# Patient Record
Sex: MALE | Race: WHITE | Employment: OTHER | URBAN - METROPOLITAN AREA
[De-identification: names, ages, dates, MRNs, and addresses within clinical notes are randomized per-mention and may not be internally consistent; named-entity substitution may affect disease eponyms.]

---

## 2019-03-26 ENCOUNTER — OFFICE VISIT (OUTPATIENT)
Dept: FAMILY MEDICINE CLINIC | Age: 44
End: 2019-03-26

## 2019-03-26 VITALS
SYSTOLIC BLOOD PRESSURE: 113 MMHG | BODY MASS INDEX: 32.97 KG/M2 | WEIGHT: 285 LBS | DIASTOLIC BLOOD PRESSURE: 78 MMHG | TEMPERATURE: 97.5 F | HEIGHT: 78 IN | OXYGEN SATURATION: 94 % | RESPIRATION RATE: 16 BRPM | HEART RATE: 86 BPM

## 2019-03-26 DIAGNOSIS — F52.9 PROBLEM WITH SEXUAL FUNCTION: ICD-10-CM

## 2019-03-26 DIAGNOSIS — R53.83 FATIGUE, UNSPECIFIED TYPE: Primary | ICD-10-CM

## 2019-03-26 RX ORDER — LORATADINE 10 MG/1
10 TABLET ORAL DAILY
Status: ON HOLD | COMMUNITY
End: 2019-05-20

## 2019-03-26 NOTE — PROGRESS NOTES
Lauren Man is a 44 y.o. male who presents today with the following: 
 
Sexual Problem The history is provided by the patient. This is a chronic problem. The problem has not changed since onset. Pertinent negatives include no chest pain and no shortness of breath. Nothing aggravates the symptoms. Nothing relieves the symptoms. He has tried nothing for the symptoms. UNABLE TO MAINTAIN ERECTION. HAS TRIED MANY OTC VITAMINS BUT DID NOT IMPROVE. DOES NOT WANT TO START VIAGRA NOW. RATHER WANTS TO EVALUATE THE CAUSE. HISTORY OF DELAYED ONSET PUBERTY. HAS SEEN DR Fauzia STOUT IN THE PAST AS A TEENAGER. RECEIVED GROWTH HORMONE INJECTIONS TO INITIATE PUBERTY. WAS NORMAL AFTER THAT UNTIL RECENTLY STARTED HAVING SYMPTOMS Bety Mckeon Chief Complaint Patient presents with [facility] Care New Patient  Fatigue  
  noticed over time a decrease in energy levels and loss of motivation - Review of Systems Constitutional: Positive for malaise/fatigue. HENT: Negative. Eyes: Negative. Respiratory: Negative. Negative for shortness of breath. Cardiovascular: Negative. Negative for chest pain. Gastrointestinal: Negative. Genitourinary: Negative. Musculoskeletal: Negative. Skin: Negative. Neurological: Negative. Endo/Heme/Allergies: Negative. Psychiatric/Behavioral: Negative. Physical Exam  
Constitutional: He is oriented to person, place, and time and well-developed, well-nourished, and in no distress. HENT:  
Head: Normocephalic and atraumatic. Right Ear: External ear normal.  
Left Ear: External ear normal.  
Nose: Nose normal.  
Mouth/Throat: No oropharyngeal exudate. Eyes: Conjunctivae are normal.  
Neck: Normal range of motion. Neck supple. No thyromegaly present. Cardiovascular: Normal rate and regular rhythm. Pulmonary/Chest: Effort normal and breath sounds normal.  
Abdominal: Soft. Bowel sounds are normal. He exhibits no distension. There is no tenderness. Musculoskeletal: Normal range of motion. He exhibits no edema. Lymphadenopathy:  
  He has no cervical adenopathy. Neurological: He is alert and oriented to person, place, and time. Skin: Skin is warm and dry. Psychiatric: Mood and affect normal.  
Nursing note and vitals reviewed. /78 (BP 1 Location: Left arm, BP Patient Position: Sitting)   Pulse 86   Temp 97.5 °F (36.4 °C) (Oral)   Resp 16   Ht 6' 7\" (2.007 m)   Wt 285 lb (129.3 kg)   SpO2 94%   BMI 32.11 kg/m² Allergies Allergen Reactions  Peanut Hives Current Outpatient Medications Medication Sig  loratadine (CLARITIN) 10 mg tablet Take 10 mg by mouth. No current facility-administered medications for this visit. Past Medical History:  
Diagnosis Date  Anxiety  Panic attacks No past surgical history on file. No results found for this visit on 03/26/19. 
 
 
1. Fatigue, unspecified type EVALUATE WITH LAB TESTS. 
 
- TSH 3RD GENERATION 
- URINALYSIS W/ RFLX MICROSCOPIC 
- VITAMIN D, 25 HYDROXY 
- VITAMIN E34 
- METABOLIC PANEL, COMPREHENSIVE 
- LIPID PANEL 
- IRON PROFILE 
- CBC WITH AUTOMATED DIFF 
- PSA, DIAGNOSTIC (PROSTATE SPECIFIC AG) - TESTOSTERONE, TOTAL, ADULT MALE 2. Problem with sexual function - PSA, DIAGNOSTIC (PROSTATE SPECIFIC AG) - TESTOSTERONE, TOTAL, ADULT MALE Follow-up and Dispositions · Return in about 1 week (around 4/2/2019) for follow up. I have discussed the diagnosis with the patient and the intended plan as seen in the above orders. The patient has received an after-visit summary and questions were answered concerning future plans. I have discussed medication side effects and warnings with the patient as well. The patient agrees and understands above plan.   
 
 
 
 
Fermín Agosto MD

## 2019-03-26 NOTE — PROGRESS NOTES
Identified pt with two pt identifiers(name and ). Chief Complaint Patient presents with [address] [place] [address] New Patient  Fatigue  
  noticed over time a decrease in energy levels Health Maintenance Due Topic  Pneumococcal 0-64 years (1 of 1 - PPSV23)  DTaP/Tdap/Td series (1 - Tdap)  Influenza Age 5 to Adult Wt Readings from Last 3 Encounters:  
19 285 lb (129.3 kg) 16 260 lb (117.9 kg) 16 260 lb (117.9 kg) Temp Readings from Last 3 Encounters:  
16 97.3 °F (36.3 °C)  
16 97.6 °F (36.4 °C) BP Readings from Last 3 Encounters:  
16 149/86  
16 151/85 Pulse Readings from Last 3 Encounters:  
16 80  
16 74 Learning Assessment: 
:  
 
No flowsheet data found. Depression Screening: 
:  
 
3 most recent PHQ Screens 3/26/2019 Little interest or pleasure in doing things Several days Feeling down, depressed, irritable, or hopeless Several days Total Score PHQ 2 2 Fall Risk Assessment: 
:  
 
No flowsheet data found. Abuse Screening: 
:  
 
No flowsheet data found. Coordination of Care Questionnaire: 
:  
 
1) Have you been to an emergency room, urgent care clinic since your last visit? no  
Hospitalized since your last visit? no          
 
2) Have you seen or consulted any other health care providers outside of [address] [place] [address] since your last visit? no  (Include any pap smears or colon screenings in this section.) 3) Do you have an Advance Directive on file? no 
Are you interested in receiving information about Advance Directives? no 
 
Patient is accompanied by self I have received verbal consent from Eusebio Hand to discuss any/all medical information while they are present in the room. Reviewed record in preparation for visit and have obtained necessary documentation. Medication reconciliation up to date and corrected with patient at this time.

## 2019-03-27 LAB
25(OH)D3+25(OH)D2 SERPL-MCNC: 32.8 NG/ML (ref 30–100)
ALBUMIN SERPL-MCNC: 4.5 G/DL (ref 3.5–5.5)
ALBUMIN/GLOB SERPL: 1.6 {RATIO} (ref 1.2–2.2)
ALP SERPL-CCNC: 66 IU/L (ref 39–117)
ALT SERPL-CCNC: 43 IU/L (ref 0–44)
APPEARANCE UR: CLEAR
AST SERPL-CCNC: 26 IU/L (ref 0–40)
BASOPHILS # BLD AUTO: 0 X10E3/UL (ref 0–0.2)
BASOPHILS NFR BLD AUTO: 1 %
BILIRUB SERPL-MCNC: 0.3 MG/DL (ref 0–1.2)
BILIRUB UR QL STRIP: NEGATIVE
BUN SERPL-MCNC: 15 MG/DL (ref 6–24)
BUN/CREAT SERPL: 14 (ref 9–20)
CALCIUM SERPL-MCNC: 9.5 MG/DL (ref 8.7–10.2)
CHLORIDE SERPL-SCNC: 104 MMOL/L (ref 96–106)
CHOLEST SERPL-MCNC: 178 MG/DL (ref 100–199)
CO2 SERPL-SCNC: 24 MMOL/L (ref 20–29)
COLOR UR: YELLOW
CREAT SERPL-MCNC: 1.09 MG/DL (ref 0.76–1.27)
EOSINOPHIL # BLD AUTO: 0.4 X10E3/UL (ref 0–0.4)
EOSINOPHIL NFR BLD AUTO: 5 %
ERYTHROCYTE [DISTWIDTH] IN BLOOD BY AUTOMATED COUNT: 13.7 % (ref 12.3–15.4)
GLOBULIN SER CALC-MCNC: 2.8 G/DL (ref 1.5–4.5)
GLUCOSE SERPL-MCNC: 111 MG/DL (ref 65–99)
GLUCOSE UR QL: NEGATIVE
HCT VFR BLD AUTO: 45.4 % (ref 37.5–51)
HDLC SERPL-MCNC: 49 MG/DL
HGB BLD-MCNC: 16 G/DL (ref 13–17.7)
HGB UR QL STRIP: NEGATIVE
IMM GRANULOCYTES # BLD AUTO: 0 X10E3/UL (ref 0–0.1)
IMM GRANULOCYTES NFR BLD AUTO: 0 %
IRON SATN MFR SERPL: 22 % (ref 15–55)
IRON SERPL-MCNC: 81 UG/DL (ref 38–169)
KETONES UR QL STRIP: NEGATIVE
LDLC SERPL CALC-MCNC: 107 MG/DL (ref 0–99)
LEUKOCYTE ESTERASE UR QL STRIP: NEGATIVE
LYMPHOCYTES # BLD AUTO: 2.7 X10E3/UL (ref 0.7–3.1)
LYMPHOCYTES NFR BLD AUTO: 39 %
MCH RBC QN AUTO: 32.3 PG (ref 26.6–33)
MCHC RBC AUTO-ENTMCNC: 35.2 G/DL (ref 31.5–35.7)
MCV RBC AUTO: 92 FL (ref 79–97)
MICRO URNS: NORMAL
MONOCYTES # BLD AUTO: 0.5 X10E3/UL (ref 0.1–0.9)
MONOCYTES NFR BLD AUTO: 7 %
NEUTROPHILS # BLD AUTO: 3.4 X10E3/UL (ref 1.4–7)
NEUTROPHILS NFR BLD AUTO: 48 %
NITRITE UR QL STRIP: NEGATIVE
PH UR STRIP: 5 [PH] (ref 5–7.5)
PLATELET # BLD AUTO: 215 X10E3/UL (ref 150–379)
POTASSIUM SERPL-SCNC: 4.1 MMOL/L (ref 3.5–5.2)
PROT SERPL-MCNC: 7.3 G/DL (ref 6–8.5)
PROT UR QL STRIP: NEGATIVE
PSA SERPL-MCNC: 0.7 NG/ML (ref 0–4)
RBC # BLD AUTO: 4.96 X10E6/UL (ref 4.14–5.8)
SODIUM SERPL-SCNC: 142 MMOL/L (ref 134–144)
SP GR UR: 1.03 (ref 1–1.03)
TESTOST SERPL-MCNC: 340 NG/DL (ref 264–916)
TIBC SERPL-MCNC: 371 UG/DL (ref 250–450)
TRIGL SERPL-MCNC: 110 MG/DL (ref 0–149)
TSH SERPL DL<=0.005 MIU/L-ACNC: 2.26 UIU/ML (ref 0.45–4.5)
UIBC SERPL-MCNC: 290 UG/DL (ref 111–343)
UROBILINOGEN UR STRIP-MCNC: 0.2 MG/DL (ref 0.2–1)
VIT B12 SERPL-MCNC: 912 PG/ML (ref 232–1245)
VLDLC SERPL CALC-MCNC: 22 MG/DL (ref 5–40)
WBC # BLD AUTO: 7.1 X10E3/UL (ref 3.4–10.8)

## 2019-03-28 ENCOUNTER — TELEPHONE (OUTPATIENT)
Dept: FAMILY MEDICINE CLINIC | Age: 44
End: 2019-03-28

## 2019-03-28 DIAGNOSIS — R79.89 LOW TESTOSTERONE IN MALE: ICD-10-CM

## 2019-03-28 DIAGNOSIS — F52.9 PROBLEM WITH SEXUAL FUNCTION: Primary | ICD-10-CM

## 2019-03-28 NOTE — TELEPHONE ENCOUNTER
CALLED PATIENT. DISCUSSED ABNORMAL LAB RESULTS IN DETAIL. ORDERED FURTHER LAB TESTS. PATIENT TO GET LABS DONE PRIOR TO NEXT VISIT.

## 2019-04-03 ENCOUNTER — OFFICE VISIT (OUTPATIENT)
Dept: FAMILY MEDICINE CLINIC | Age: 44
End: 2019-04-03

## 2019-04-03 VITALS
HEIGHT: 78 IN | WEIGHT: 284 LBS | RESPIRATION RATE: 20 BRPM | BODY MASS INDEX: 32.86 KG/M2 | TEMPERATURE: 98.4 F | OXYGEN SATURATION: 98 % | DIASTOLIC BLOOD PRESSURE: 80 MMHG | SYSTOLIC BLOOD PRESSURE: 121 MMHG | HEART RATE: 86 BPM

## 2019-04-03 DIAGNOSIS — R53.83 FATIGUE, UNSPECIFIED TYPE: ICD-10-CM

## 2019-04-03 DIAGNOSIS — F52.9 PROBLEM WITH SEXUAL FUNCTION: Primary | ICD-10-CM

## 2019-04-03 LAB
CORTIS AM PEAK SERPL-MCNC: 18.6 UG/DL (ref 6.2–19.4)
FSH SERPL-ACNC: 3.1 MIU/ML (ref 1.5–12.4)
LH SERPL-ACNC: 4.6 MIU/ML (ref 1.7–8.6)
PROLACTIN SERPL-MCNC: 14.8 NG/ML (ref 4–15.2)
T4 SERPL-MCNC: 6.5 UG/DL (ref 4.5–12)
TESTOST FREE SERPL-MCNC: 10.8 PG/ML (ref 6.8–21.5)
TESTOST SERPL-MCNC: 436 NG/DL (ref 264–916)
TRANSFERRIN SERPL-MCNC: 259 MG/DL (ref 200–370)

## 2019-04-03 NOTE — LETTER
4/3/2019 11:21 AM 
 
Mr. Prince Lazaro [address] [id number] Dear Prince Lazaro: 
 
Please find your most recent results below. Resulted Orders PROLACTIN (Collected: 4/1/2019  8:19 AM) Result Value Ref Range Prolactin 14.8 4.0 - 15.2 ng/mL Narrative Performed at:  [address]  [id number] : Jc Burks MD, Phone:  [phone] [address] AND LH (Collected: 4/1/2019  8:19 AM) Result Value Ref Range Luteinizing hormone 4.6 1.7 - 8.6 mIU/mL FSH 3.1 1.5 - 12.4 mIU/mL Narrative Performed at:  [address]  [id number] : Jc Burks MD, Phone:  [phone] CORTISOL, AM (Collected: 4/1/2019  8:19 AM) Result Value Ref Range Cortisol, a.m. 18.6 6.2 - 19.4 ug/dL Narrative Performed at:  [address]  [id number] : Jc Burks MD, Phone:  [phone] TESTOSTERONE, FREE & TOTAL (Collected: 4/1/2019  8:19 AM) Result Value Ref Range Testosterone 436 264 - 916 ng/dL Comment:  
   Adult male reference interval is based on a population of 
healthy nonobese males (BMI <30) between 23and 44years old. [address] [place] [address], [address] [place] [address] [id number]. PMID: 91879791. Free testosterone (Direct) 10.8 6.8 - 21.5 pg/mL Narrative Performed at:  [address]  [id number] : Jc Burks MD, Phone:  [phone] TRANSFERRIN (Collected: 4/1/2019  8:19 AM) Result Value Ref Range Transferrin 259 200 - 370 mg/dL Narrative Performed at:  [address]  [id number] : Jc Burks MD, Phone:  [phone] T4 (THYROXINE) (Collected: 4/1/2019  8:19 AM) Result Value Ref Range T4, Total 6.5 4.5 - 12.0 ug/dL Narrative Performed at:  [place] [address] [address]  [id number] : Luz Castellon MD, Phone:  [phone] RECOMMENDATIONS: 
Given in office Please call me if you have any questions: [phone] Sincerely, Dora Londono MD

## 2019-04-03 NOTE — PROGRESS NOTES
1. Have you been to the ER, urgent care clinic since your last visit? Hospitalized since your last visit? No    2. Have you seen or consulted any other health care providers outside of the [address] [place] [address] since your last visit? Include any pap smears or colon screening.  No

## 2019-04-03 NOTE — PROGRESS NOTES
Juan Alberto Samuel is a 38 y.o. male who presents today with the following:    HPI  Chief Complaint   Patient presents with    Labs     follow up       Review of Systems   Constitutional: Negative. HENT: Negative. Eyes: Negative. Respiratory: Negative. Cardiovascular: Negative. Gastrointestinal: Negative. Genitourinary: Negative. Musculoskeletal: Negative. Skin: Negative. Neurological: Negative. Endo/Heme/Allergies: Negative. Psychiatric/Behavioral: Negative. Physical Exam   Constitutional: He is oriented to person, place, and time and well-developed, well-nourished, and in no distress. HENT:   Head: Normocephalic and atraumatic. Right Ear: External ear normal.   Left Ear: External ear normal.   Nose: Nose normal.   Mouth/Throat: No oropharyngeal exudate. Eyes: Conjunctivae are normal.   Neck: Normal range of motion. Neck supple. No thyromegaly present. Cardiovascular: Normal rate and regular rhythm. Pulmonary/Chest: Effort normal and breath sounds normal.   Abdominal: Soft. Bowel sounds are normal. He exhibits no distension. There is no tenderness. Musculoskeletal: Normal range of motion. He exhibits no edema. Lymphadenopathy:     He has no cervical adenopathy. Neurological: He is alert and oriented to person, place, and time. Skin: Skin is warm and dry. Psychiatric: Mood and affect normal.   Nursing note and vitals reviewed. /80 (BP 1 Location: Left arm, BP Patient Position: Sitting)   Pulse 86   Temp 98.4 °F (36.9 °C) (Oral)   Resp 20   Ht 6' 7\" (2.007 m)   Wt 284 lb (128.8 kg)   SpO2 98%   BMI 31.99 kg/m²     Allergies   Allergen Reactions    Peanut Hives       Current Outpatient Medications   Medication Sig    loratadine (CLARITIN) 10 mg tablet Take 10 mg by mouth. No current facility-administered medications for this visit. Past Medical History:   Diagnosis Date    Anxiety     Panic attacks        History reviewed.  No pertinent surgical history. No results found for this visit on 04/03/19. 1. Problem with sexual function  TESTOSTERONE LEVELS IN NORMAL RANGE    - REFERRAL TO ENDOCRINOLOGY    2. Fatigue, unspecified type  ALL LABS NORMAL  - REFERRAL TO ENDOCRINOLOGY        Follow-up and Dispositions    · Return if symptoms worsen or fail to improve. I have discussed the diagnosis with the patient and the intended plan as seen in the above orders. The patient has received an after-visit summary and questions were answered concerning future plans. I have discussed medication side effects and warnings with the patient as well. The patient agrees and understands above plan.            Harsha Moore MD

## 2019-04-10 ENCOUNTER — OFFICE VISIT (OUTPATIENT)
Dept: FAMILY MEDICINE CLINIC | Age: 44
End: 2019-04-10

## 2019-04-10 VITALS
HEIGHT: 78 IN | RESPIRATION RATE: 16 BRPM | WEIGHT: 287 LBS | HEART RATE: 70 BPM | BODY MASS INDEX: 33.21 KG/M2 | TEMPERATURE: 98.6 F | DIASTOLIC BLOOD PRESSURE: 75 MMHG | SYSTOLIC BLOOD PRESSURE: 115 MMHG | OXYGEN SATURATION: 96 %

## 2019-04-10 DIAGNOSIS — R30.0 BURNING WITH URINATION: Primary | ICD-10-CM

## 2019-04-10 LAB
BILIRUB UR QL STRIP: NEGATIVE
GLUCOSE UR-MCNC: NEGATIVE MG/DL
KETONES P FAST UR STRIP-MCNC: NEGATIVE MG/DL
PH UR STRIP: 5.5 [PH] (ref 4.6–8)
PROT UR QL STRIP: NORMAL
SP GR UR STRIP: 1.03 (ref 1–1.03)
UA UROBILINOGEN AMB POC: NORMAL (ref 0.2–1)
URINALYSIS CLARITY POC: CLEAR
URINALYSIS COLOR POC: YELLOW
URINE BLOOD POC: NEGATIVE
URINE LEUKOCYTES POC: NEGATIVE
URINE NITRITES POC: NEGATIVE

## 2019-04-10 RX ORDER — NITROFURANTOIN 25; 75 MG/1; MG/1
100 CAPSULE ORAL 2 TIMES DAILY
Qty: 14 CAP | Refills: 0 | Status: SHIPPED | OUTPATIENT
Start: 2019-04-10 | End: 2019-04-17

## 2019-04-10 NOTE — PROGRESS NOTES
Shola Cano is a 38 y.o. male      Chief Complaint   Patient presents with    Bladder Infection     x 1 day          1. Have you been to the ER, urgent care clinic since your last visit? Hospitalized since your last visit? no      2. Have you seen or consulted any other health care providers outside of the [address] [place] [address] since your last visit? Include any pap smears or colon screening.   no

## 2019-04-10 NOTE — PROGRESS NOTES
Assessment/Plan:     Diagnoses and all orders for this visit:    1. Burning with urination  -     AMB POC URINE DIP STICK MANUAL W/O MICRO CHEMSTRIP-10  -     CULTURE, URINE  -     nitrofurantoin, macrocrystal-monohydrate, (MACROBID) 100 mg capsule; Take 1 Cap by mouth two (2) times a day for 7 days. - Worsening, start [address] today, will culture urine and treat accordingly. RTC if symptoms do not resolve. Drink more water, limit soda and tea. Follow-up and Dispositions    · Return for PRN. Discussed expected course/resolution/complications of diagnosis in detail with patient.    Medication risks/benefits/costs/interactions/alternatives discussed with patient.    Pt was given after visit summary which includes diagnoses, current medications & vitals. Pt expressed understanding with the diagnosis and plan        Subjective:      Rowe Mohs is a 38 y.o. male who presents for had concerns including Bladder Infection (x 1 day ). Here today for burning when urinating for a couple of days. Denies fevers, hematuria. Had a UTI in 2017 in the summer. States since he had a kidney stone in 2016 that was 2nd and 3rd and passed them naturally. Was given flomax. States he drinks a lot of sodas every day and stopped them about a week ago. Denies exposure to STD. Quit smoking 10 years ago. Vapes on occasion. Current Outpatient Medications   Medication Sig Dispense Refill    nitrofurantoin, macrocrystal-monohydrate, (MACROBID) 100 mg capsule Take 1 Cap by mouth two (2) times a day for 7 days. 14 Cap 0    loratadine (CLARITIN) 10 mg tablet Take 10 mg by mouth. Allergies   Allergen Reactions    Peanut Hives       ROS:   Review of Systems   Respiratory: Negative for shortness of breath. Cardiovascular: Negative for chest pain and palpitations. Gastrointestinal: Negative for abdominal pain. Genitourinary: Positive for dysuria and urgency.  Negative for flank pain, frequency and hematuria. Neurological: Negative for dizziness and headaches. Objective:     Visit Vitals  /75 (BP 1 Location: Right arm, BP Patient Position: Sitting)   Pulse 70   Temp 98.6 °F (37 °C) (Oral)   Resp 16   Ht 6' 7\" (2.007 m)   Wt 287 lb (130.2 kg)   SpO2 96%   BMI 32.33 kg/m²       Vitals and Nurse Documentation reviewed. Physical Exam   Constitutional: He is well-developed, well-nourished, and in no distress. No distress. HENT:   Head: Normocephalic and atraumatic. Cardiovascular: Normal rate, regular rhythm and normal heart sounds. Exam reveals no gallop and no friction rub. No murmur heard. Pulmonary/Chest: Effort normal and breath sounds normal. No respiratory distress. He has no wheezes. He has no rales. Abdominal: Normal appearance and bowel sounds are normal. There is no hepatosplenomegaly. There is no tenderness. There is no rebound and no CVA tenderness. Genitourinary:   Genitourinary Comments: Refused by patient   Neurological: He is alert. Skin: He is not diaphoretic.    Psychiatric: Affect normal.       Results for orders placed or performed in visit on 04/10/19   AMB POC URINE DIP STICK MANUAL W/O MICRO CHEMSTRIP-10   Result Value Ref Range    Color (UA POC) Yellow     Clarity (UA POC) Clear     Specific gravity (UA POC) 1.030 1.001 - 1.035    pH (UA POC) 5.5 4.6 - 8.0    Leukocyte esterase (UA POC) Negative Negative    Nitrites (UA POC) Negative Negative    Protein (UA POC) Trace Negative    Glucose (UA POC) Negative Negative mg/dL    Ketones (UA POC) Negative Negative    Urobilinogen (UA POC) 0.2 mg/dL 0.2 - 1    Bilirubin (UA POC) Negative Negative    Blood (UA POC) Negative Negative

## 2019-04-10 NOTE — PATIENT INSTRUCTIONS
Painful Urination (Dysuria): Care Instructions  Your Care Instructions  Burning pain with urination (dysuria) is a common symptom of a urinary tract infection or other urinary problems. The bladder may become inflamed. This can cause pain when the bladder fills and empties. You may also feel pain if the tube that carries urine from the bladder to the outside of the body (urethra) gets irritated or infected. Sexually transmitted infections (STIs) also may cause pain when you urinate. Sometimes the pain can be caused by things other than an infection. The urethra can be irritated by soaps, perfumes, or foreign objects in the urethra. Kidney stones can cause pain when they pass through the urethra. The cause may be hard to find. You may need tests. Treatment for painful urination depends on the cause. Follow-up care is a key part of your treatment and safety. Be sure to make and go to all appointments, and call your doctor if you are having problems. It's also a good idea to know your test results and keep a list of the medicines you take. How can you care for yourself at home? · Drink extra water for the next day or two. This will help make the urine less concentrated. (If you have kidney, heart, or liver disease and have to limit fluids, talk with your doctor before you increase the amount of fluids you drink.)  · Avoid drinks that are carbonated or have caffeine. They can irritate the bladder. · Urinate often. Try to empty your bladder each time. For women:  · Urinate right after you have sex. · After going to the bathroom, wipe from front to back. · Avoid douches, bubble baths, and feminine hygiene sprays. And avoid other feminine hygiene products that have deodorants. When should you call for help? Call your doctor now or seek immediate medical care if:    · You have new symptoms, such as fever, nausea, or vomiting.     · You have new or worse symptoms of a urinary problem. For example:  ?  You have blood or pus in your urine. ? You have chills or body aches. ? It hurts worse to urinate. ? You have groin or belly pain. ? You have pain in your back just below your rib cage (the flank area).    Watch closely for changes in your health, and be sure to contact your doctor if you have any problems. Where can you learn more? Go to http://ladan-david.info/. Enter Y035 in the search box to learn more about \"Painful Urination (Dysuria): Care Instructions. \"  Current as of: March 20, 2018  Content Version: 11.9  © 3133-9901 [employer]. Care instructions adapted under license by [employer] (which disclaims liability or warranty for this information). If you have questions about a medical condition or this instruction, always ask your healthcare professional. [address] any warranty or liability for your use of this information.

## 2019-04-12 LAB — BACTERIA UR CULT: NO GROWTH

## 2019-04-15 NOTE — PROGRESS NOTES
TC - to Mr. Cash Vargas, he is not having any more burning  at this time, he was told to f/u as needed- MJ

## 2019-05-08 ENCOUNTER — OFFICE VISIT (OUTPATIENT)
Dept: FAMILY MEDICINE CLINIC | Age: 44
End: 2019-05-08

## 2019-05-08 VITALS
DIASTOLIC BLOOD PRESSURE: 86 MMHG | TEMPERATURE: 98.1 F | BODY MASS INDEX: 31.93 KG/M2 | HEIGHT: 78 IN | OXYGEN SATURATION: 98 % | SYSTOLIC BLOOD PRESSURE: 126 MMHG | RESPIRATION RATE: 18 BRPM | HEART RATE: 87 BPM | WEIGHT: 276 LBS

## 2019-05-08 DIAGNOSIS — F41.0 ANXIETY ATTACK: Primary | ICD-10-CM

## 2019-05-08 RX ORDER — ALPRAZOLAM 0.25 MG/1
0.25 TABLET ORAL
Qty: 30 TAB | Refills: 0 | Status: SHIPPED | OUTPATIENT
Start: 2019-05-08 | End: 2019-05-22

## 2019-05-08 RX ORDER — BUSPIRONE HYDROCHLORIDE 7.5 MG/1
7.5 TABLET ORAL 3 TIMES DAILY
Qty: 90 TAB | Refills: 3 | Status: SHIPPED | OUTPATIENT
Start: 2019-05-08 | End: 2019-05-16 | Stop reason: SINTOL

## 2019-05-08 RX ORDER — BUSPIRONE HYDROCHLORIDE 7.5 MG/1
7.5 TABLET ORAL 3 TIMES DAILY
Qty: 90 TAB | Refills: 3 | Status: SHIPPED | OUTPATIENT
Start: 2019-05-08 | End: 2019-05-08 | Stop reason: SDUPTHER

## 2019-05-08 NOTE — PROGRESS NOTES
Lisandro Angela is a 38 y.o. male who presents today with the following: HPI Chief Complaint Patient presents with  
[facility] Follow Up  
  panic attack 05/07/19 - [facility] [place]  Cyst  
  on right foot x2-3 weeks and swelling  Medication Evaluation  
  pt wanting to start new med, but does not want SSRI due to side effects  Anxiety Patient had an ultrasound done at [facility], ER, which showed epididymal cyst.  He was advised to follow-up with [facility] in 2 days. Patient will follow-up with them. Patient has already seen a urologist.  His next scheduled appointment with [facility] is in October but he will see them sooner for epididymal cyst. 
 
He says he had a side effect from the NSAIDs that were prescribed by an urgent care. Patient was having flank pain and was prescribed Naprosyn. He had bilateral leg swelling up to his knees with that. He stopped taking that medication and his swelling has improved. But he noticed that he has developed a cyst on the right foot dorsal surface. The cyst is nontender, noninfectious. Patient says that he had a panic attack when he felt a tenderness in his scrotum. He went to the ER his heart rate was 160 at that time. EKG was normal but tachycardic. Ultrasound of the scrotum showed epididymal cyst 3 cm in size. Patient states that he is becoming a hypochondriac. He is very anxious about his health. He is started taking healthy diet and drinking water. Review of Systems Constitutional: Negative. HENT: Negative. Eyes: Negative. Respiratory: Negative. Cardiovascular: Negative. Gastrointestinal: Negative. Genitourinary: Negative. Lower abdominal pain going down in the inguinal area. Musculoskeletal: Negative. Cyst on right foot dorsal surface. Skin: Negative. Neurological: Negative. Endo/Heme/Allergies: Negative. Psychiatric/Behavioral: Positive for depression. Negative for suicidal ideas. The patient is nervous/anxious. Physical Exam  
Constitutional: He is oriented to person, place, and time and well-developed, well-nourished, and in no distress. HENT:  
Head: Normocephalic and atraumatic. Right Ear: External ear normal.  
Left Ear: External ear normal.  
Nose: Nose normal.  
Mouth/Throat: No oropharyngeal exudate. Eyes: Conjunctivae are normal.  
Neck: Normal range of motion. Neck supple. No thyromegaly present. Cardiovascular: Normal rate and regular rhythm. Pulmonary/Chest: Effort normal and breath sounds normal.  
Abdominal: Soft. Bowel sounds are normal. He exhibits no distension. There is no tenderness. Musculoskeletal: Normal range of motion. He exhibits no edema. Feet: 
 
Lymphadenopathy:  
  He has no cervical adenopathy. Neurological: He is alert and oriented to person, place, and time. Skin: Skin is warm and dry. Psychiatric: Affect normal. His mood appears anxious. He expresses impulsivity. He exhibits a depressed mood. He expresses no homicidal and no suicidal ideation. He expresses no suicidal plans and no homicidal plans. Nursing note and vitals reviewed. /86   Pulse 87   Temp 98.1 °F (36.7 °C) (Oral)   Resp 18   Ht 6' 7\" (2.007 m)   Wt 276 lb (125.2 kg)   SpO2 98%   BMI 31.09 kg/m² Allergies Allergen Reactions  Peanut Hives Current Outpatient Medications Medication Sig  ALPRAZolam (XANAX) 0.25 mg tablet Take 1 Tab by mouth nightly as needed for Anxiety. Max Daily Amount: 0.25 mg.  
 busPIRone (BUSPAR) 7.5 mg tablet Take 1 Tab by mouth three (3) times daily.  loratadine (CLARITIN) 10 mg tablet Take 10 mg by mouth. No current facility-administered medications for this visit. Past Medical History:  
Diagnosis Date  Anxiety  Panic attacks History reviewed. No pertinent surgical history. No results found for this visit on 05/08/19. 
 
 
1. Anxiety attack Patient is tearful in the office. He does not want to try SSRIs at this time. He does not want to try psychotherapy or counseling at this time. He is very anxious about his health. He is requesting to try BuSpar. He is also requesting to get Xanax. I advised patient that I would only give Xanax as needed 30 tablets once. Patient to start on BuSpar and we can go up on that dose. If he needs more Xanax I will refer him to psychiatrist. 
 
- ALPRAZolam Estil Massa) 0.25 mg tablet; Take 1 Tab by mouth nightly as needed for Anxiety. Max Daily Amount: 0.25 mg. Dispense: 30 Tab; Refill: 0 
- busPIRone (BUSPAR) 7.5 mg tablet; Take 1 Tab by mouth three (3) times daily. Dispense: 90 Tab; Refill: 3 Foot cyst: I advised patient to monitor the cyst for 10 days. If it does not resolve on its own I will order x-rays. Follow-up and Dispositions · Return in about 1 month (around 6/8/2019) for follow up, med check. I have discussed the diagnosis with the patient and the intended plan as seen in the above orders. The patient has received an after-visit summary and questions were answered concerning future plans. I have discussed medication side effects and warnings with the patient as well. The patient agrees and understands above plan.   
 
 
 
 
Dora Londono MD

## 2019-05-08 NOTE — PROGRESS NOTES
Chief Complaint Patient presents with  
[facility] Follow Up  
  panic attack 05/07/19 - [facility] [place]  Cyst  
  on right foot x2-3 weeks and swelling  Medication Evaluation  
  pt wanting to start new med, but does not want SSRI due to side effects  Anxiety Pt went to ER yesterday, because he was having pain in his groin that initiated a panic attack. US negative, EKG negative, and Urine + labs negative per patient. 3 most recent PHQ Screens 4/10/2019 Little interest or pleasure in doing things Not at all Feeling down, depressed, irritable, or hopeless Not at all Total Score PHQ 2 0

## 2019-05-14 ENCOUNTER — TELEPHONE (OUTPATIENT)
Dept: FAMILY MEDICINE CLINIC | Age: 44
End: 2019-05-14

## 2019-05-14 NOTE — TELEPHONE ENCOUNTER
Pt called stating the Buspar side effects are making him zombie-like. He said he doesn't have anxiety but he doesn't feel much of anything. Says morning pill makes him drowsy and dizzy and he is slow to react. Pt wants to know if he should keep taking medication.

## 2019-05-16 ENCOUNTER — OFFICE VISIT (OUTPATIENT)
Dept: FAMILY MEDICINE CLINIC | Age: 44
End: 2019-05-16

## 2019-05-16 VITALS
TEMPERATURE: 97.8 F | SYSTOLIC BLOOD PRESSURE: 111 MMHG | BODY MASS INDEX: 31.82 KG/M2 | RESPIRATION RATE: 16 BRPM | WEIGHT: 275 LBS | HEART RATE: 84 BPM | HEIGHT: 78 IN | OXYGEN SATURATION: 97 % | DIASTOLIC BLOOD PRESSURE: 75 MMHG

## 2019-05-16 DIAGNOSIS — R73.01 IMPAIRED FASTING BLOOD SUGAR: ICD-10-CM

## 2019-05-16 DIAGNOSIS — R23.2 ABNORMAL FLUSHING AND SWEATING: ICD-10-CM

## 2019-05-16 DIAGNOSIS — R00.0 TACHYCARDIA: Primary | ICD-10-CM

## 2019-05-16 DIAGNOSIS — F41.0 ANXIETY ATTACK: ICD-10-CM

## 2019-05-16 DIAGNOSIS — R61 ABNORMAL FLUSHING AND SWEATING: ICD-10-CM

## 2019-05-16 RX ORDER — FLUOXETINE HYDROCHLORIDE 20 MG/1
20 CAPSULE ORAL DAILY
Qty: 30 CAP | Refills: 3 | Status: SHIPPED | OUTPATIENT
Start: 2019-05-16 | End: 2019-05-22

## 2019-05-16 RX ORDER — LEVOFLOXACIN 500 MG/1
TABLET, FILM COATED ORAL
Refills: 0 | Status: ON HOLD | COMMUNITY
Start: 2019-05-11 | End: 2019-05-20

## 2019-05-16 NOTE — PROGRESS NOTES
Identified pt with two pt identifiers(name and ). Reviewed record in preparation for visit and have obtained necessary documentation. Chief Complaint   Patient presents with    Leg Pain     Left leg from knee to groin, states, \"sometimes I feel like theres a little bubbly feeling behind left knee. \" [facility]- scrotal US done cyst found, Dx'd with epididymitis and given levafloxacin, which is causing dizzines; pt requesting second opinion    Tingling     of hands    Medication Evaluation     [facility]    Labs     requesting bloodwork, he states \"I just feel like something is wrong. I shouldnt be having pain like this\"        Health Maintenance Due   Topic    DTaP/Tdap/Td series (1 - Tdap)       Coordination of Care Questionnaire:  :   1) Have you been to an emergency room, urgent care, or hospitalized since your last visit? If yes, where when, and reason for visit? yes   [facility] for today's complaint    2. Have seen or consulted any other health care provider since your last visit? If yes, where when, and reason for visit? NO    Patient is accompanied by self I have received verbal consent from Radha Austin to discuss any/all medical information while they are present in the room.

## 2019-05-16 NOTE — PROGRESS NOTES
Russ Joseph is a 38 y.o. male who presents today with the following:    HPI  Chief Complaint   Patient presents with    Leg Pain     Left leg from knee to groin, states, \"sometimes I feel like theres a little bubbly feeling behind left knee. \" [facility]- scrotal US done cyst found, Dx'd with epididymitis and given levafloxacin, which is causing dizzines; pt requesting second opinion    Tingling     of hands    Medication Evaluation     [facility]    Labs     requesting bloodwork, he states \"I just feel like something is wrong. I shouldnt be having pain like this\"     Patient presents with multiple symptoms, his groin ache started a few weeks ago and he was started on antibiotic related to urinary tract infection but it did not improve his pain. He was referred to [facility] which did a CT scan which was normal.  Then patient started having symptoms of testicular pain and swelling of the scrotum and redness of the scrotum, but he went to urgent Dr. Kandy Harrington ER where his blood pressure was elevated and heart rate was high. An ultrasound of the scrotum was done in the ER which was normal except for a small epididymal cyst.  He was referred to urology again. Over the weekend he went to an urgent care because of burning sensation in the scrotum area and was seen by Dr. Leonardo Hodges. He was diagnosed with epididymitis and was started on Levaquin. Patient is saying that the Levaquin is making him more dizzy although his dizziness has been around for couple of months. He has also called his urologist and he said that epididymal cyst can cause epididymitis. He was offered an appointment with the urology PA but he refused at this time. Regarding his dizziness it has been going on since January. Patient states that he is trying to lose weight and not eating much but drinking lots of water. Other symptoms include sweating tingling lightheadedness.   His fingers and hands started getting tingling up to his elbows. He was taking multivitamins and now he has stopped taking all but one. Some nurse has told him that he should get work-up to rule out Cushing syndrome because his face is round. He wants to get lab work done today. He has symptoms of esophageal reflux and abdominal bloating. He also feels hot flashes. Previous lab work was normal and I reviewed it even today with him. He says that he has taken Xanax which helped his symptoms so he is attributing some of his symptoms to his anxiety. BuSpar was helping him in regards to making him not anxious but he stopped taking it because he felt lack of emotion and made him drowsy. In the past he has tried Wellbutrin which made him jittery, Celexa which caused sexual side effects, Lexapro also caused sexual side effects he has tried counseling in the past but did not like it and does not want to go back to psychiatrist.  He is open to try another SSRI at this time. Review of Systems   Constitutional: Positive for malaise/fatigue. HENT: Negative. Eyes: Negative. Respiratory: Negative. Cardiovascular: Negative. Gastrointestinal: Negative. Genitourinary: Negative. Groin and inguinal area pain   Musculoskeletal: Negative. Skin: Negative. Neurological: Positive for tingling. Endo/Heme/Allergies: Negative. Psychiatric/Behavioral: Positive for depression. The patient is nervous/anxious. Physical Exam   Constitutional: He is oriented to person, place, and time and well-developed, well-nourished, and in no distress. HENT:   Head: Normocephalic and atraumatic. Right Ear: External ear normal.   Left Ear: External ear normal.   Nose: Nose normal.   Mouth/Throat: No oropharyngeal exudate. Eyes: Conjunctivae are normal.   Neck: Normal range of motion. Neck supple. No thyromegaly present. Cardiovascular: Normal rate and regular rhythm.    Pulmonary/Chest: Effort normal and breath sounds normal.   Abdominal: Soft. Bowel sounds are normal. He exhibits no distension. There is no tenderness. Musculoskeletal: Normal range of motion. He exhibits no edema. Lymphadenopathy:     He has no cervical adenopathy. Neurological: He is alert and oriented to person, place, and time. Skin: Skin is warm and dry. Psychiatric: Mood and affect normal.   Nursing note and vitals reviewed. /75   Pulse 84   Temp 97.8 °F (36.6 °C) (Oral)   Resp 16   Ht 6' 7\" (2.007 m)   Wt 275 lb (124.7 kg)   SpO2 97%   BMI 30.98 kg/m²     Allergies   Allergen Reactions    Diclofenac Swelling     Leg swelling    Peanut Hives       Current Outpatient Medications   Medication Sig    FLUoxetine (PROZAC) 20 mg capsule Take 1 Cap by mouth daily.  ALPRAZolam (XANAX) 0.25 mg tablet Take 1 Tab by mouth nightly as needed for Anxiety. Max Daily Amount: 0.25 mg.    levoFLOXacin (LEVAQUIN) 500 mg tablet take 1 tablet by mouth once daily for 10 days    loratadine (CLARITIN) 10 mg tablet Take 10 mg by mouth daily. No current facility-administered medications for this visit. Past Medical History:   Diagnosis Date    Anxiety     Panic attacks        No past surgical history on file. No results found for this visit on 05/16/19.      1. Tachycardia  EKG was normal.  Patient feels anxious. He attributes his high heart rate to his anxiety  - AMB POC EKG ROUTINE W/ 12 LEADS, INTER & REP  - METABOLIC PANEL, COMPREHENSIVE    2. Anxiety attack  Patient has been having recent anxiety attacks more frequently. He says that Xanax helps with some of his symptoms. He stopped taking BuSpar due to side effects. He wants to start Prozac. I counseled him that he needs to see a psychiatrist for his management of anxiety attacks and panic attacks.  - FLUoxetine (PROZAC) 20 mg capsule; Take 1 Cap by mouth daily. Dispense: 30 Cap; Refill: 3  - REFERRAL TO PSYCHIATRY    3.  Abnormal flushing and sweating  Patient wants to check for Cushing's syndrome. I will order some lab work. He denies any use of prednisone or any type of steroids in the past but recently  - HEMOGLOBIN A1C WITH EAG  - CBC WITH AUTOMATED DIFF  - SALIVARY CORTISOL X2, TIMED  - CORTISOL, URINE FREE 24 HR    4. Impaired fasting blood sugar  Has had abnormal fasting blood sugar wants to rule out diabetes. - HEMOGLOBIN A1C WITH EAG  - METABOLIC PANEL, COMPREHENSIVE        Follow-up and Dispositions    · Return in about 2 weeks (around 5/30/2019) for follow up. I have discussed the diagnosis with the patient and the intended plan as seen in the above orders. The patient has received an after-visit summary and questions were answered concerning future plans. I have discussed medication side effects and warnings with the patient as well. The patient agrees and understands above plan.            Jazzmine Montoya MD

## 2019-05-17 LAB
ALBUMIN SERPL-MCNC: 4.8 G/DL (ref 3.5–5.5)
ALBUMIN/GLOB SERPL: 2.1 {RATIO} (ref 1.2–2.2)
ALP SERPL-CCNC: 66 IU/L (ref 39–117)
ALT SERPL-CCNC: 36 IU/L (ref 0–44)
AST SERPL-CCNC: 26 IU/L (ref 0–40)
BASOPHILS # BLD AUTO: 0 X10E3/UL (ref 0–0.2)
BASOPHILS NFR BLD AUTO: 0 %
BILIRUB SERPL-MCNC: 0.5 MG/DL (ref 0–1.2)
BUN SERPL-MCNC: 15 MG/DL (ref 6–24)
BUN/CREAT SERPL: 13 (ref 9–20)
CALCIUM SERPL-MCNC: 9.8 MG/DL (ref 8.7–10.2)
CHLORIDE SERPL-SCNC: 100 MMOL/L (ref 96–106)
CO2 SERPL-SCNC: 26 MMOL/L (ref 20–29)
CREAT SERPL-MCNC: 1.12 MG/DL (ref 0.76–1.27)
EOSINOPHIL # BLD AUTO: 0.1 X10E3/UL (ref 0–0.4)
EOSINOPHIL NFR BLD AUTO: 2 %
ERYTHROCYTE [DISTWIDTH] IN BLOOD BY AUTOMATED COUNT: 13.8 % (ref 12.3–15.4)
EST. AVERAGE GLUCOSE BLD GHB EST-MCNC: 114 MG/DL
GLOBULIN SER CALC-MCNC: 2.3 G/DL (ref 1.5–4.5)
GLUCOSE SERPL-MCNC: 121 MG/DL (ref 65–99)
HBA1C MFR BLD: 5.6 % (ref 4.8–5.6)
HCT VFR BLD AUTO: 46.4 % (ref 37.5–51)
HGB BLD-MCNC: 16.4 G/DL (ref 13–17.7)
IMM GRANULOCYTES # BLD AUTO: 0 X10E3/UL (ref 0–0.1)
IMM GRANULOCYTES NFR BLD AUTO: 0 %
LYMPHOCYTES # BLD AUTO: 1.4 X10E3/UL (ref 0.7–3.1)
LYMPHOCYTES NFR BLD AUTO: 27 %
MCH RBC QN AUTO: 32.3 PG (ref 26.6–33)
MCHC RBC AUTO-ENTMCNC: 35.3 G/DL (ref 31.5–35.7)
MCV RBC AUTO: 92 FL (ref 79–97)
MONOCYTES # BLD AUTO: 0.3 X10E3/UL (ref 0.1–0.9)
MONOCYTES NFR BLD AUTO: 5 %
NEUTROPHILS # BLD AUTO: 3.4 X10E3/UL (ref 1.4–7)
NEUTROPHILS NFR BLD AUTO: 66 %
PLATELET # BLD AUTO: 205 X10E3/UL (ref 150–379)
POTASSIUM SERPL-SCNC: 4.5 MMOL/L (ref 3.5–5.2)
PROT SERPL-MCNC: 7.1 G/DL (ref 6–8.5)
RBC # BLD AUTO: 5.07 X10E6/UL (ref 4.14–5.8)
SODIUM SERPL-SCNC: 140 MMOL/L (ref 134–144)
WBC # BLD AUTO: 5.2 X10E3/UL (ref 3.4–10.8)

## 2019-05-20 ENCOUNTER — HOSPITAL ENCOUNTER (EMERGENCY)
Age: 44
Discharge: PSYCHIATRIC OR SUBSTANCE USE FACILITY | End: 2019-05-20
Attending: EMERGENCY MEDICINE
Payer: COMMERCIAL

## 2019-05-20 ENCOUNTER — HOSPITAL ENCOUNTER (INPATIENT)
Age: 44
LOS: 2 days | Discharge: HOME | DRG: 756 | End: 2019-05-22
Attending: PSYCHIATRY & NEUROLOGY | Admitting: PSYCHIATRY & NEUROLOGY
Payer: COMMERCIAL

## 2019-05-20 VITALS
TEMPERATURE: 97.8 F | HEART RATE: 92 BPM | RESPIRATION RATE: 18 BRPM | HEIGHT: 78 IN | SYSTOLIC BLOOD PRESSURE: 166 MMHG | BODY MASS INDEX: 31.82 KG/M2 | OXYGEN SATURATION: 97 % | DIASTOLIC BLOOD PRESSURE: 106 MMHG | WEIGHT: 275 LBS

## 2019-05-20 DIAGNOSIS — R45.851 SUICIDAL IDEATION: ICD-10-CM

## 2019-05-20 DIAGNOSIS — F41.1 ANXIETY STATE: Primary | ICD-10-CM

## 2019-05-20 PROBLEM — F32.A DEPRESSION: Status: ACTIVE | Noted: 2019-05-20

## 2019-05-20 LAB
ALBUMIN SERPL-MCNC: 4.2 G/DL (ref 3.5–5)
ALBUMIN/GLOB SERPL: 1.3 {RATIO} (ref 1.1–2.2)
ALP SERPL-CCNC: 68 U/L (ref 45–117)
ALT SERPL-CCNC: 47 U/L (ref 12–78)
AMPHET UR QL SCN: NEGATIVE
ANION GAP SERPL CALC-SCNC: 3 MMOL/L (ref 5–15)
APAP SERPL-MCNC: <2 UG/ML (ref 10–30)
APPEARANCE UR: CLEAR
AST SERPL-CCNC: 25 U/L (ref 15–37)
ATRIAL RATE: 84 BPM
BACTERIA URNS QL MICRO: NEGATIVE /HPF
BARBITURATES UR QL SCN: NEGATIVE
BASOPHILS # BLD: 0 K/UL (ref 0–0.1)
BASOPHILS NFR BLD: 1 % (ref 0–1)
BENZODIAZ UR QL: NEGATIVE
BILIRUB SERPL-MCNC: 0.7 MG/DL (ref 0.2–1)
BILIRUB UR QL: NEGATIVE
BUN SERPL-MCNC: 13 MG/DL (ref 6–20)
BUN/CREAT SERPL: 13 (ref 12–20)
CALCIUM SERPL-MCNC: 9 MG/DL (ref 8.5–10.1)
CALCULATED P AXIS, ECG09: 19 DEGREES
CALCULATED R AXIS, ECG10: -9 DEGREES
CALCULATED T AXIS, ECG11: 13 DEGREES
CANNABINOIDS UR QL SCN: NEGATIVE
CHLORIDE SERPL-SCNC: 106 MMOL/L (ref 97–108)
CK SERPL-CCNC: 135 U/L (ref 39–308)
CO2 SERPL-SCNC: 28 MMOL/L (ref 21–32)
COCAINE UR QL SCN: NEGATIVE
COLOR UR: ABNORMAL
CREAT SERPL-MCNC: 1.03 MG/DL (ref 0.7–1.3)
DIAGNOSIS, 93000: NORMAL
DIFFERENTIAL METHOD BLD: NORMAL
DRUG SCRN COMMENT,DRGCM: NORMAL
EOSINOPHIL # BLD: 0.1 K/UL (ref 0–0.4)
EOSINOPHIL NFR BLD: 2 % (ref 0–7)
EPITH CASTS URNS QL MICRO: ABNORMAL /LPF
ERYTHROCYTE [DISTWIDTH] IN BLOOD BY AUTOMATED COUNT: 12.1 % (ref 11.5–14.5)
ETHANOL SERPL-MCNC: <10 MG/DL
GLOBULIN SER CALC-MCNC: 3.3 G/DL (ref 2–4)
GLUCOSE SERPL-MCNC: 110 MG/DL (ref 65–100)
GLUCOSE UR STRIP.AUTO-MCNC: NEGATIVE MG/DL
HCT VFR BLD AUTO: 46 % (ref 36.6–50.3)
HGB BLD-MCNC: 16.1 G/DL (ref 12.1–17)
HGB UR QL STRIP: NEGATIVE
HYALINE CASTS URNS QL MICRO: ABNORMAL /LPF (ref 0–5)
IMM GRANULOCYTES # BLD AUTO: 0 K/UL (ref 0–0.04)
IMM GRANULOCYTES NFR BLD AUTO: 0 % (ref 0–0.5)
KETONES UR QL STRIP.AUTO: 15 MG/DL
LEUKOCYTE ESTERASE UR QL STRIP.AUTO: NEGATIVE
LIPASE SERPL-CCNC: 206 U/L (ref 73–393)
LYMPHOCYTES # BLD: 2.1 K/UL (ref 0.8–3.5)
LYMPHOCYTES NFR BLD: 33 % (ref 12–49)
MCH RBC QN AUTO: 32.7 PG (ref 26–34)
MCHC RBC AUTO-ENTMCNC: 35 G/DL (ref 30–36.5)
MCV RBC AUTO: 93.5 FL (ref 80–99)
METHADONE UR QL: NEGATIVE
MONOCYTES # BLD: 0.5 K/UL (ref 0–1)
MONOCYTES NFR BLD: 7 % (ref 5–13)
NEUTS SEG # BLD: 3.6 K/UL (ref 1.8–8)
NEUTS SEG NFR BLD: 57 % (ref 32–75)
NITRITE UR QL STRIP.AUTO: NEGATIVE
NRBC # BLD: 0 K/UL (ref 0–0.01)
NRBC BLD-RTO: 0 PER 100 WBC
OPIATES UR QL: NEGATIVE
P-R INTERVAL, ECG05: 154 MS
PCP UR QL: NEGATIVE
PH UR STRIP: 5.5 [PH] (ref 5–8)
PLATELET # BLD AUTO: 177 K/UL (ref 150–400)
PMV BLD AUTO: 10.2 FL (ref 8.9–12.9)
POTASSIUM SERPL-SCNC: 4 MMOL/L (ref 3.5–5.1)
PROT SERPL-MCNC: 7.5 G/DL (ref 6.4–8.2)
PROT UR STRIP-MCNC: NEGATIVE MG/DL
Q-T INTERVAL, ECG07: 360 MS
QRS DURATION, ECG06: 92 MS
QTC CALCULATION (BEZET), ECG08: 425 MS
RBC # BLD AUTO: 4.92 M/UL (ref 4.1–5.7)
RBC #/AREA URNS HPF: ABNORMAL /HPF (ref 0–5)
SALICYLATES SERPL-MCNC: <1.7 MG/DL (ref 2.8–20)
SODIUM SERPL-SCNC: 137 MMOL/L (ref 136–145)
SP GR UR REFRACTOMETRY: 1.01 (ref 1–1.03)
UR CULT HOLD, URHOLD: NORMAL
UROBILINOGEN UR QL STRIP.AUTO: 0.2 EU/DL (ref 0.2–1)
VENTRICULAR RATE, ECG03: 84 BPM
WBC # BLD AUTO: 6.3 K/UL (ref 4.1–11.1)
WBC URNS QL MICRO: ABNORMAL /HPF (ref 0–4)

## 2019-05-20 PROCEDURE — 90791 PSYCH DIAGNOSTIC EVALUATION: CPT

## 2019-05-20 PROCEDURE — 81001 URINALYSIS AUTO W/SCOPE: CPT

## 2019-05-20 PROCEDURE — 65220000003 HC RM SEMIPRIVATE PSYCH

## 2019-05-20 PROCEDURE — 93005 ELECTROCARDIOGRAM TRACING: CPT

## 2019-05-20 PROCEDURE — 74011250636 HC RX REV CODE- 250/636: Performed by: EMERGENCY MEDICINE

## 2019-05-20 PROCEDURE — 99285 EMERGENCY DEPT VISIT HI MDM: CPT

## 2019-05-20 PROCEDURE — 96361 HYDRATE IV INFUSION ADD-ON: CPT

## 2019-05-20 PROCEDURE — 83690 ASSAY OF LIPASE: CPT

## 2019-05-20 PROCEDURE — 85025 COMPLETE CBC W/AUTO DIFF WBC: CPT

## 2019-05-20 PROCEDURE — 80307 DRUG TEST PRSMV CHEM ANLYZR: CPT

## 2019-05-20 PROCEDURE — 96360 HYDRATION IV INFUSION INIT: CPT

## 2019-05-20 PROCEDURE — 82550 ASSAY OF CK (CPK): CPT

## 2019-05-20 PROCEDURE — 36415 COLL VENOUS BLD VENIPUNCTURE: CPT

## 2019-05-20 PROCEDURE — 80053 COMPREHEN METABOLIC PANEL: CPT

## 2019-05-20 RX ORDER — BENZTROPINE MESYLATE 1 MG/ML
2 INJECTION INTRAMUSCULAR; INTRAVENOUS
Status: DISCONTINUED | OUTPATIENT
Start: 2019-05-20 | End: 2019-05-22 | Stop reason: HOSPADM

## 2019-05-20 RX ORDER — ADHESIVE BANDAGE
30 BANDAGE TOPICAL DAILY PRN
Status: DISCONTINUED | OUTPATIENT
Start: 2019-05-20 | End: 2019-05-22 | Stop reason: HOSPADM

## 2019-05-20 RX ORDER — BENZTROPINE MESYLATE 2 MG/1
2 TABLET ORAL
Status: DISCONTINUED | OUTPATIENT
Start: 2019-05-20 | End: 2019-05-22 | Stop reason: HOSPADM

## 2019-05-20 RX ORDER — OLANZAPINE 5 MG/1
5 TABLET ORAL
Status: DISCONTINUED | OUTPATIENT
Start: 2019-05-20 | End: 2019-05-22 | Stop reason: HOSPADM

## 2019-05-20 RX ORDER — IBUPROFEN 200 MG
1 TABLET ORAL
Status: DISCONTINUED | OUTPATIENT
Start: 2019-05-20 | End: 2019-05-22 | Stop reason: HOSPADM

## 2019-05-20 RX ORDER — TRAZODONE HYDROCHLORIDE 50 MG/1
50 TABLET ORAL
Status: DISCONTINUED | OUTPATIENT
Start: 2019-05-20 | End: 2019-05-21

## 2019-05-20 RX ORDER — ACETAMINOPHEN 325 MG/1
650 TABLET ORAL
Status: DISCONTINUED | OUTPATIENT
Start: 2019-05-20 | End: 2019-05-22 | Stop reason: HOSPADM

## 2019-05-20 RX ADMIN — SODIUM CHLORIDE 1000 ML: 900 INJECTION, SOLUTION INTRAVENOUS at 07:00

## 2019-05-20 NOTE — PROGRESS NOTES
Problem: Anxiety Goal: *Alleviation of anxiety Note:  
Patient denies SI. Med and meal compliant. Anxious. Somatic. Cooperative.

## 2019-05-20 NOTE — H&P
Hospitalist Admission NoteNAME: Asia Amanda :  1975 MRN:  [id number] Date/Time:  2019 5:27 PM 
 
Patient PCP: Tamea Shone, MD 
________________________________________________________________________ My assessment of this patient's clinical condition and my plan of care is as follows. Assessment / Plan: 
 
1) manic  Episode: Patient at his normal state os mine at the time of this encounter. Psych to eval and manage 2) Anxiety: Psych to eval and manage 3) Hx PTSD Patient medically stable for psych treatment Code Status: full Surrogate Decision Maker: DVT Prophylaxis:  
GI Prophylaxis: not indicated Baseline: lives at home Subjective: CHIEF COMPLAINT: anxiety, raising thoughts HISTORY OF PRESENT ILLNESS:    
Leopoldo Fine is a 38 y.o. Male PMH anxiety,PTSD coming to ED [facility] because raising thoughts and anxiety. He mentioned that he was placed on Prozac and xanax recently an ever since he has not been feeling well. Last week he was feeling \"tingling in his hands and feet\". Recently he was placed on levaquin for apparently epididymitis. He came to the ED because around 3 am he was pacing in his back yard and could not control his thoughts. HE called the help line and a nurse suggested for him to go to an ed for further eval. 
 
We were asked to admit for work up and evaluation of the above problems. Past Medical History:  
Diagnosis Date  Anxiety  Nicotine vapor product user  Panic attacks History reviewed. No pertinent surgical history. Social History Tobacco Use  Smoking status: Never Smoker  Smokeless tobacco: Current User  Tobacco comment: Vaping Substance Use Topics  Alcohol use: Yes Comment: patient reports 1-2 drinks a year Family History Problem Relation Age of Onset  No Known Problems Mother  No Known Problems Father  Suicide Maternal Aunt Allergies Allergen Reactions  Diclofenac Swelling Leg swelling  Levaquin [Levofloxacin] Other (comments) Pins and needles sensation to arms and legs  Peanut Hives Prior to Admission medications Medication Sig Start Date End Date Taking? Authorizing Provider FLUoxetine (PROZAC) 20 mg capsule Take 1 Cap by mouth daily. 5/16/19  Yes Armando Turner MD  
ALPRAZolam (XANAX) 0.25 mg tablet Take 1 Tab by mouth nightly as needed for Anxiety. Max Daily Amount: 0.25 mg. 5/8/19  Yes Armando Turner MD  
 
 
REVIEW OF SYSTEMS:    
I am not able to complete the review of systems because: The patient is intubated and sedated The patient has altered mental status due to his acute medical problems The patient has baseline aphasia from prior stroke(s) The patient has baseline dementia and is not reliable historian The patient is in acute medical distress and unable to provide information Total of 12 systems reviewed as follows:   
   POSITIVE= underlined text  Negative = text not underlined General:  fever, chills, sweats, generalized weakness, weight loss/gain,  
   loss of appetite Eyes:    blurred vision, eye pain, loss of vision, double vision ENT:    rhinorrhea, pharyngitis Respiratory:   cough, sputum production, SOB, CANNON, wheezing, pleuritic pain  
Cardiology:   chest pain, palpitations, orthopnea, PND, edema, syncope Gastrointestinal:  abdominal pain , N/V, diarrhea, dysphagia, constipation, bleeding Genitourinary:  frequency, urgency, dysuria, hematuria, incontinence Muskuloskeletal :  arthralgia, myalgia, back pain Hematology:  easy bruising, nose or gum bleeding, lymphadenopathy Dermatological: rash, ulceration, pruritis, color change / jaundice Endocrine:   hot flashes or polydipsia Neurological:  headache, dizziness, confusion, focal weakness, paresthesia, Speech difficulties, memory loss, gait difficulty Psychological: Feelings of anxiety, depression, agitation Objective: VITALS:   
Visit Vitals /88 Pulse 85 Temp 97.6 °F (36.4 °C) Resp 18 Ht 6' 5\" (1.956 m) Wt 124.7 kg (275 lb) BMI 32.61 kg/m² PHYSICAL EXAM: 
 
 
_______________________________________________________________________ Care Plan discussed with: 
  Comments Patient x Family RN Care Manager Consultant:     
_______________________________________________________________________ Expected  Disposition:  
Home with Family x HH/PT/OT/RN   
SNF/LTC   
[facility]   
________________________________________________________________________ TOTAL TIME:  35  Minutes Critical Care Provided     Minutes non procedure based Comments Reviewed previous records  
>50% of visit spent in counseling and coordination of care  Discussion with patient and/or family and questions answered 
  
 
________________________________________________________________________ Signed: Pamela Chi MD 
 
 Procedures: see electronic medical records for all procedures/Xrays and details which were not copied into this note but were reviewed prior to creation of Plan. LAB DATA REVIEWED:   
Recent Results (from the past 24 hour(s)) EKG, 12 LEAD, INITIAL Collection Time: 05/20/19  6:58 AM  
Result Value Ref Range Ventricular Rate 84 BPM  
 Atrial Rate 84 BPM  
 P-R Interval 154 ms QRS Duration 92 ms Q-T Interval 360 ms QTC Calculation (Bezet) 425 ms Calculated P Axis 19 degrees Calculated R Axis -9 degrees Calculated T Axis 13 degrees Diagnosis Sinus rhythm with sinus arrhythmia Minimal voltage criteria for LVH, may be normal variant Borderline ECG Confirmed by Cecilia Villar MD. ([id number]) on 5/20/2019 8:43:17 AM 
  
CBC WITH AUTOMATED DIFF Collection Time: 05/20/19  6:59 AM  
Result Value Ref Range WBC 6.3 4.1 - 11.1 K/uL  
 RBC 4.92 4.10 - 5.70 M/uL  
 HGB 16.1 12.1 - 17.0 g/dL HCT 46.0 36.6 - 50.3 % MCV 93.5 80.0 - 99.0 FL  
 MCH 32.7 26.0 - 34.0 PG  
 MCHC 35.0 30.0 - 36.5 g/dL  
 RDW 12.1 11.5 - 14.5 % PLATELET 831 560 - 093 K/uL MPV 10.2 8.9 - 12.9 FL  
 NRBC 0.0 0  WBC ABSOLUTE NRBC 0.00 0.00 - 0.01 K/uL NEUTROPHILS 57 32 - 75 % LYMPHOCYTES 33 12 - 49 % MONOCYTES 7 5 - 13 % EOSINOPHILS 2 0 - 7 % BASOPHILS 1 0 - 1 % IMMATURE GRANULOCYTES 0 0.0 - 0.5 % ABS. NEUTROPHILS 3.6 1.8 - 8.0 K/UL  
 ABS. LYMPHOCYTES 2.1 0.8 - 3.5 K/UL  
 ABS. MONOCYTES 0.5 0.0 - 1.0 K/UL  
 ABS. EOSINOPHILS 0.1 0.0 - 0.4 K/UL  
 ABS. BASOPHILS 0.0 0.0 - 0.1 K/UL  
 ABS. IMM. GRANS. 0.0 0.00 - 0.04 K/UL  
 DF AUTOMATED METABOLIC PANEL, COMPREHENSIVE Collection Time: 05/20/19  6:59 AM  
Result Value Ref Range Sodium 137 136 - 145 mmol/L Potassium 4.0 3.5 - 5.1 mmol/L Chloride 106 97 - 108 mmol/L  
 CO2 28 21 - 32 mmol/L Anion gap 3 (L) 5 - 15 mmol/L Glucose 110 (H) 65 - 100 mg/dL  BUN 13 6 - 20 MG/DL  
 Creatinine 1.03 0.70 - 1.30 MG/DL  
 BUN/Creatinine ratio 13 12 - 20 GFR est AA >60 >60 ml/min/1.73m2 GFR est non-AA >60 >60 ml/min/1.73m2 Calcium 9.0 8.5 - 10.1 MG/DL Bilirubin, total 0.7 0.2 - 1.0 MG/DL  
 ALT (SGPT) 47 12 - 78 U/L  
 AST (SGOT) 25 15 - 37 U/L Alk. phosphatase 68 45 - 117 U/L Protein, total 7.5 6.4 - 8.2 g/dL Albumin 4.2 3.5 - 5.0 g/dL Globulin 3.3 2.0 - 4.0 g/dL A-G Ratio 1.3 1.1 - 2.2 LIPASE Collection Time: 05/20/19  6:59 AM  
Result Value Ref Range Lipase 206 73 - 393 U/L  
ETHYL ALCOHOL Collection Time: 05/20/19  6:59 AM  
Result Value Ref Range ALCOHOL(ETHYL),SERUM <06 <90 MG/DL  
SALICYLATE Collection Time: 05/20/19  6:59 AM  
Result Value Ref Range Salicylate level <6.1 (L) 2.8 - 20.0 MG/DL  
ACETAMINOPHEN Collection Time: 05/20/19  6:59 AM  
Result Value Ref Range Acetaminophen level <2 (L) 10 - 30 ug/mL CK W/ REFLX CKMB Collection Time: 05/20/19  6:59 AM  
Result Value Ref Range  39 - 308 U/L  
URINALYSIS W/MICROSCOPIC Collection Time: 05/20/19  8:42 AM  
Result Value Ref Range Color YELLOW/STRAW Appearance CLEAR CLEAR Specific gravity 1.008 1.003 - 1.030    
 pH (UA) 5.5 5.0 - 8.0 Protein NEGATIVE  NEG mg/dL Glucose NEGATIVE  NEG mg/dL Ketone 15 (A) NEG mg/dL Bilirubin NEGATIVE  NEG Blood NEGATIVE  NEG Urobilinogen 0.2 0.2 - 1.0 EU/dL Nitrites NEGATIVE  NEG Leukocyte Esterase NEGATIVE  NEG    
 WBC 0-4 0 - 4 /hpf  
 RBC 0-5 0 - 5 /hpf Epithelial cells FEW FEW /lpf Bacteria NEGATIVE  NEG /hpf Hyaline cast 0-2 0 - 5 /lpf URINE CULTURE HOLD SAMPLE Collection Time: 05/20/19  8:42 AM  
Result Value Ref Range Urine culture hold URINE ON HOLD IN MICROBIOLOGY DEPT FOR 3 DAYS. IF UNPRESERVED URINE IS SUBMITTED, IT CANNOT BE USED FOR ADDITIONAL TESTING AFTER 24 HRS, RECOLLECTION WILL BE REQUIRED.   
DRUG SCREEN, URINE  
 Collection Time: 05/20/19  8:42 AM  
Result Value Ref Range AMPHETAMINES NEGATIVE  NEG    
 BARBITURATES NEGATIVE  NEG BENZODIAZEPINES NEGATIVE  NEG    
 COCAINE NEGATIVE  NEG METHADONE NEGATIVE  NEG    
 OPIATES NEGATIVE  NEG    
 PCP(PHENCYCLIDINE) NEGATIVE  NEG    
 THC (TH-CANNABINOL) NEGATIVE  NEG Drug screen comment (NOTE)

## 2019-05-20 NOTE — ROUTINE PROCESS
TRANSFER - OUT REPORT: 
 
Verbal report given to Rena Ambrose RN(name) on Go Dunbar  being transferred to [facility] [place] [facility](unit) for routine progression of care Report consisted of patients Situation, Background, Assessment and  
Recommendations(SBAR). Information from the following report(s) SBAR,  Vitals, all test results, suicide precautions was reviewed with the receiving nurse. Lines:    
 
Opportunity for questions and clarification was provided. [facility] Reassessment at this time is unchanged pt is stable for transport.

## 2019-05-20 NOTE — ED NOTES
[facility]. Pt verbalized good understanding and agreement with plan.

## 2019-05-20 NOTE — ED PROVIDER NOTES
This is a 28-year-old gentleman who comes emergency room with chief complaint of anxiety. Patient states that he was recent started on Prozac last Thursday. Patient states that he stopped that yesterday. Patient also states that he takes Xanax from time to time. Patient states that he was alone when a couple weeks ago for epididymitis. Patient they had to stop this because of tingling in his extremities. He states that this morning he woke up with a pain which was sharp in his left upper quadrant. Patient states that he had some racing thoughts in which she was going to hurt himself. Patient denies any plan to hurt himself. Patient states that he became diaphoretic and more nervous. Patient states he has history of anxiety and PTSD. Patient states that he was on Celexa however this was discontinued last summer. Patient denies any psychiatric admissions or attempts to harm himself in the past. 
 
The history is provided by the patient. No  was used. Anxiety This is a new problem. The current episode started 3 to 5 hours ago. The problem has not changed since onset. The problem occurs constantly. The pain is associated with stress. The pain is at a severity of 5/10. The pain is moderate. Associated symptoms include abdominal pain and diaphoresis. Pertinent negatives include no back pain, no cough, no dizziness, no fever, no numbness, no palpitations, no shortness of breath, no vomiting and no weakness. His past medical history does not include DM or HTN. Past Medical History:  
Diagnosis Date  Anxiety  Panic attacks No past surgical history on file. Family History:  
Problem Relation Age of Onset  No Known Problems Mother  No Known Problems Father Social History Socioeconomic History  Marital status: SINGLE Spouse name: Not on file  Number of children: Not on file  Years of education: Not on file  Highest education level: Not on file Occupational History  Not on file Social Needs  Financial resource strain: Not on file  Food insecurity:  
  Worry: Not on file Inability: Not on file  Transportation needs:  
  Medical: Not on file Non-medical: Not on file Tobacco Use  Smoking status: Never Smoker  Smokeless tobacco: Current User  Tobacco comment: Vaping Substance and Sexual Activity  Alcohol use: Yes Comment: rarely  Drug use: Yes Types: Marijuana Comment: rarely  Sexual activity: Not Currently Lifestyle  Physical activity:  
  Days per week: Not on file Minutes per session: Not on file  Stress: Not on file Relationships  Social connections:  
  Talks on phone: Not on file Gets together: Not on file Attends Christian service: Not on file Active member of club or organization: Not on file Attends meetings of clubs or organizations: Not on file Relationship status: Not on file  Intimate partner violence:  
  Fear of current or ex partner: Not on file Emotionally abused: Not on file Physically abused: Not on file Forced sexual activity: Not on file Other Topics Concern  Not on file Social History Narrative  Not on file ALLERGIES: Diclofenac; Levaquin [levofloxacin]; and Peanut Review of Systems Constitutional: Positive for diaphoresis. Negative for appetite change, chills, fever and unexpected weight change. HENT: Negative for ear pain, hearing loss, rhinorrhea and trouble swallowing. Eyes: Negative for pain and visual disturbance. Respiratory: Negative for cough, chest tightness and shortness of breath. Cardiovascular: Negative for chest pain and palpitations. Gastrointestinal: Positive for abdominal pain. Negative for abdominal distention, blood in stool and vomiting. Genitourinary: Negative for dysuria, hematuria and urgency. Musculoskeletal: Negative for back pain and myalgias. Skin: Negative for rash. Neurological: Positive for light-headedness. Negative for dizziness, syncope, weakness and numbness. Psychiatric/Behavioral: Negative for confusion and suicidal ideas. The patient is nervous/anxious. All other systems reviewed and are negative. Vitals:  
 05/20/19 6336 BP: (!) 188/117 Pulse: (!) 101 Resp: 22 Temp: 97.9 °F (36.6 °C) Weight: 124.7 kg (275 lb) Height: 6' 6\" (1.981 m) Physical Exam  
Constitutional: He is oriented to person, place, and time. He appears well-developed and well-nourished. No distress. HENT:  
Head: Normocephalic and atraumatic. Right Ear: External ear normal.  
Left Ear: External ear normal.  
Nose: Nose normal.  
Mouth/Throat: Oropharynx is clear and moist. No oropharyngeal exudate. Eyes: Pupils are equal, round, and reactive to light. Conjunctivae and EOM are normal. Right eye exhibits no discharge. Left eye exhibits no discharge. No scleral icterus. Neck: Normal range of motion. Neck supple. No JVD present. No tracheal deviation present. Cardiovascular: Normal rate, regular rhythm, normal heart sounds and intact distal pulses. Exam reveals no gallop and no friction rub. No murmur heard. Pulmonary/Chest: Effort normal and breath sounds normal. No stridor. No respiratory distress. He has no decreased breath sounds. He has no wheezes. He has no rhonchi. He has no rales. He exhibits no tenderness. Abdominal: Soft. Bowel sounds are normal. He exhibits no distension. There is no tenderness. There is no rebound and no guarding. Musculoskeletal: Normal range of motion. He exhibits no edema or tenderness. Neurological: He is alert and oriented to person, place, and time. He has normal strength and normal reflexes. He displays normal reflexes. No cranial nerve deficit or sensory deficit. He exhibits normal muscle tone. Coordination normal. GCS eye subscore is 4. GCS verbal subscore is 5. GCS motor subscore is 6. Skin: Skin is warm and dry. No rash noted. He is not diaphoretic. No erythema. No pallor. Psychiatric: His behavior is normal. Judgment normal. His mood appears anxious. His speech is rapid and/or pressured. He is not actively hallucinating. Cognition and memory are normal. He expresses suicidal (earlier this morning) ideation. He expresses no suicidal plans and no homicidal plans. He is attentive. Nursing note and vitals reviewed. MDM Number of Diagnoses or Management Options Anxiety state:  
  
Amount and/or Complexity of Data Reviewed Clinical lab tests: ordered Tests in the medicine section of CPT®: ordered and reviewed Discuss the patient with other providers: yes ([facility]) Independent visualization of images, tracings, or specimens: yes (ekg) Risk of Complications, Morbidity, and/or Mortality Presenting problems: moderate Diagnostic procedures: low Management options: moderate Patient Progress Patient progress: stable Procedures Chief Complaint Patient presents with  Anxiety The patient's presenting problems have been discussed, and they are in agreement with the care plan formulated and outlined with them. I have encouraged them to ask questions as they arise throughout their visit. MEDICATIONS GIVEN: 
Medications  
sodium chloride 0.9 % bolus infusion 1,000 mL (1,000 mL IntraVENous New Bag 5/20/19 0700) LABS REVIEWED: 
Recent Results (from the past 24 hour(s)) EKG, 12 LEAD, INITIAL Collection Time: 05/20/19  6:58 AM  
Result Value Ref Range Ventricular Rate 84 BPM  
 Atrial Rate 84 BPM  
 P-R Interval 154 ms QRS Duration 92 ms Q-T Interval 360 ms QTC Calculation (Bezet) 425 ms Calculated P Axis 19 degrees Calculated R Axis -9 degrees Calculated T Axis 13 degrees Diagnosis Sinus rhythm with sinus arrhythmia with fusion complexes Minimal voltage criteria for LVH, may be normal variant Borderline ECG 
 When compared with ECG of 02-JUN-2016 00:08, 
fusion complexes are now present CBC WITH AUTOMATED DIFF Collection Time: 05/20/19  6:59 AM  
Result Value Ref Range WBC 6.3 4.1 - 11.1 K/uL  
 RBC 4.92 4.10 - 5.70 M/uL  
 HGB 16.1 12.1 - 17.0 g/dL HCT 46.0 36.6 - 50.3 % MCV 93.5 80.0 - 99.0 FL  
 MCH 32.7 26.0 - 34.0 PG  
 MCHC 35.0 30.0 - 36.5 g/dL  
 RDW 12.1 11.5 - 14.5 % PLATELET 084 990 - 652 K/uL MPV 10.2 8.9 - 12.9 FL  
 NRBC 0.0 0  WBC ABSOLUTE NRBC 0.00 0.00 - 0.01 K/uL NEUTROPHILS 57 32 - 75 % LYMPHOCYTES 33 12 - 49 % MONOCYTES 7 5 - 13 % EOSINOPHILS 2 0 - 7 % BASOPHILS 1 0 - 1 % IMMATURE GRANULOCYTES 0 0.0 - 0.5 % ABS. NEUTROPHILS 3.6 1.8 - 8.0 K/UL  
 ABS. LYMPHOCYTES 2.1 0.8 - 3.5 K/UL  
 ABS. MONOCYTES 0.5 0.0 - 1.0 K/UL  
 ABS. EOSINOPHILS 0.1 0.0 - 0.4 K/UL  
 ABS. BASOPHILS 0.0 0.0 - 0.1 K/UL  
 ABS. IMM. GRANS. 0.0 0.00 - 0.04 K/UL  
 DF AUTOMATED    
 
 
VITAL SIGNS: 
Patient Vitals for the past 12 hrs: 
 Temp Pulse Resp BP  
05/20/19 0624 97.9 °F (36.6 °C) (!) 101 22 (!) 188/117 RADIOLOGY RESULTS: 
The following have been ordered and reviewed: 
No results found. ED EKG interpretation: 
Rhythm: normal sinus rhythm; and regular . Rate (approx.): 84; Axis: normal; P wave: normal; QRS interval: normal ; ST/T wave: normal; Other findings: left ventricular hypertrophy, abnormal ekg. This EKG was interpreted by Chelsey Becerril DO, ED Provider. CONSULTATIONS:  
[facility] PROGRESS NOTES: 
7:25 AM 
Change of shift. Care of patient signed over to Dr. Amy Medina. Bedside handoff complete. DIAGNOSIS: 
 
1. Anxiety state PLAN: 
 
Final disposition will be made by oncoming physician. ED COURSE: The patient's hospital course has been uncomplicated.

## 2019-05-20 NOTE — BH NOTES
Primary Nurse Elisha Maloney and Aliza Dos Santos RN performed a dual skin assessment on this patient No impairment noted Reagan score is 23 Skin intact. Multiple Arm tattoos noted.

## 2019-05-20 NOTE — ED NOTES
Pt and mother updated on report given and transfer by [facility] scheduled with timeline for approximate arrival. Pt and mother agreeable. Pt has eaten a salad brought to him by his mother.

## 2019-05-20 NOTE — ED NOTES
Recalled Vadim Trinidad from [address] [place] [address] she explained Toan Hand was coming to ER to see patient should be arriving shortly. Dr Eliezer Pedersen made aware.

## 2019-05-20 NOTE — ED NOTES
Pt reports \"I was on Celexa for years I had counseling and came off of it in may of last year. I was doing good but then I started taking Levaquin for infection and on that I started having tingling in my arms and legs my doctor thought having anxiety and panic attaches  I took Buspar and xanax that did not work so last Thursday they started me on Prozac 20mg once a day. I stopped taking the Levaquin on Friday and the tingling stopped the next day. I stopped xanax Saturday and last dose of Prozac was yesterday morning at 8am. Yesterday afternoon I started going down the Rabbit hole. I talked to a friend that helped but I woke up with left stomach pain and that sent me into a panic. I went outside and was pacing my mind was telling me to get the gun and kill myself. I went upstairs to my mother and I called the nurse line she told me to go to the ER. \" My mind is not telling me to do anything at this time I just feel I need help I cant do this on my own. \"

## 2019-05-20 NOTE — BH NOTES
MD called for orders. SHAN Wallis called back. Soledad Baeza received orders. 1400: callled np about trazadone. 1436 NP called back and gave telephone order for 50 mg every bedtime for pt. Will update MAR. Hospitalist paged. Will await call back.  called back. Made aware of the H&P consult.

## 2019-05-20 NOTE — ED NOTES
Bedside shift change report given to [facility] (oncoming nurse) by Brissa Weiss. Scout Tate RN (offgoing nurse). Report included the following information SBAR, suicide precautions.

## 2019-05-20 NOTE — ED NOTES
TRANSFER - OUT REPORT: 
 
Verbal report given to THOM Hernandez NRP with [facility](name) on [facility] Joseph  [facility] for routine progression of care Report consisted of patients Situation, Background, Assessment and  
Recommendations(SBAR). Information from the following report(s) SBAR was reviewed with the receiving nurse. Lines:    
 
Opportunity for questions and clarification was provided. Patient transported with: [facility]  BLS monitoring suicide precautions. Reassessment at this time is unchanged pt is stable fot transport.

## 2019-05-20 NOTE — ED NOTES
7:19 AM 
Change of shift. Care of patient taken over from Dr. Grace Romero to Dr. Adilson Franco; H&P reviewed, bedside handoff complete. Awaiting [facility] evaluation and labs to result. CONSULT NOTE: 
8:04 AM Lori Lopez MD spoke with Mc Gold for [facility]. Discussed available diagnostic tests and clinical findings. Francoiseed Arnoldo is coming to see pt. 
 
9:05 AM 
Carolyne called, states her colleague is coming to see pt because she cannot make it here. 9:14 AM 
[facility] here to see pt.  
 
10:35 AM 
Pt will be transferred to psychiatry at [facility] [place], accepted by Dr. Lynda Salamanca.
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
## 2019-05-20 NOTE — ED TRIAGE NOTES
Patient arrives with complaints \" I believe I may have serotonin syndrome\" as he was talking to his nurse on the nurse line with his insurance company. Patient started taking Prozac on Thursday and now he has hot flashes, feelings of discomfort to LUQ , mind is racing, and having thoughts of harming himself.

## 2019-05-20 NOTE — PROGRESS NOTES
TRANSFER - IN REPORT: 
 
Verbal report received from [facility]) on Maria Dolores Farley  being received from S. ED(unit) for Psych admission. Report consisted of patients Situation, Background, Assessment and  
Recommendations(SBAR). Information from the following report(s) SBAR was reviewed with the receiving nurse. Opportunity for questions and clarification was provided. Assessment completed upon patients arrival to unit and care assumed.

## 2019-05-21 PROCEDURE — 74011250637 HC RX REV CODE- 250/637: Performed by: NURSE PRACTITIONER

## 2019-05-21 PROCEDURE — 65220000003 HC RM SEMIPRIVATE PSYCH

## 2019-05-21 PROCEDURE — 74011250637 HC RX REV CODE- 250/637: Performed by: PSYCHIATRY & NEUROLOGY

## 2019-05-21 RX ORDER — HYDROXYZINE 50 MG/1
50 TABLET, FILM COATED ORAL
Status: DISCONTINUED | OUTPATIENT
Start: 2019-05-21 | End: 2019-05-22

## 2019-05-21 RX ORDER — BUSPIRONE HYDROCHLORIDE 7.5 MG/1
7.5 TABLET ORAL 3 TIMES DAILY
COMMUNITY
End: 2019-05-22

## 2019-05-21 RX ORDER — QUETIAPINE FUMARATE 100 MG/1
100 TABLET, FILM COATED ORAL
Status: DISCONTINUED | OUTPATIENT
Start: 2019-05-21 | End: 2019-05-22 | Stop reason: HOSPADM

## 2019-05-21 RX ADMIN — HYDROXYZINE HYDROCHLORIDE 50 MG: 50 TABLET, FILM COATED ORAL at 11:15

## 2019-05-21 RX ADMIN — MAGNESIUM HYDROXIDE 30 ML: 400 SUSPENSION ORAL at 00:24

## 2019-05-21 RX ADMIN — HYDROXYZINE HYDROCHLORIDE 50 MG: 50 TABLET, FILM COATED ORAL at 17:40

## 2019-05-21 RX ADMIN — HYDROXYZINE HYDROCHLORIDE 50 MG: 50 TABLET, FILM COATED ORAL at 04:27

## 2019-05-21 RX ADMIN — QUETIAPINE FUMARATE 100 MG: 100 TABLET ORAL at 21:24

## 2019-05-21 RX ADMIN — ACETAMINOPHEN 650 MG: 325 TABLET ORAL at 02:38
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
## 2019-05-21 NOTE — BH NOTES
GROUP THERAPY PROGRESS NOTE    Claudell Fells is participating in Cognitive Behavioral Therapy. Group time: 50 minutes    Personal goal for participation: The goal of the group was to introduce cognitive behavioral therapy concepts to include the cognitive behavioral triangle, cognitive distortions, and positive thinking to promote positive feelings and actions. Goal orientation: personal    Group therapy participation: active    Therapeutic interventions reviewed and discussed: Cognitive Behavior [place], Cognitive Distortions, Positive thinking/Reframing    Impression of participation: Anmol Moreland attended group. He was alert, oriented, and engaged. He understood topic presented and gave many good examples of how it applied to his personal situation. He had participated in CBT previously and reported that after doing well for a long period of time, discontinued therapy and medication, but had a relapse. He is open and accepting of the need to recognize triggers and resume mental health services.

## 2019-05-21 NOTE — PROGRESS NOTES
Admission Medication Reconciliation:    Information obtained from:  Patient interview / [facility] / [place] Vides    Comments/Recommendations: Updated PTA meds/reviewed patient's allergies. 1)  The patient reports that he had treatment for anxiety in the past (~8 years). He was treated successfully with citalopram at that time. Reports that he stopped medications in May or June of 2018. Recently, his provider started medications for anxiety. He was started on fluoxetine last Thursday (5/16/19) and buspirone/alprazolam a week prior to that. Reports that the the buspirone \"knocked him out\" so that he was not taking alprazolam.    2)  Medication changes (since last review): Added  - buspirone 7.5mg TID     3)  The [facility] Prescription Monitoring Program () was assessed to determine fill history of any controlled medications. The patient has filled two controlled medications  in the last 2 years:  - 5/8/19: alprazolam 0.25mg, #30, 30ds  - 10/2/17: dextroamphetamine salts 20mg, #60, 30ds     Allergies:  Diclofenac; Levaquin [levofloxacin]; and Peanut    Significant PMH/Disease States:   Past Medical History:   Diagnosis Date    Anxiety     Nicotine vapor product user     Panic attacks      Chief Complaint for this Admission:  No chief complaint on file. Prior to Admission Medications:   Prior to Admission Medications   Prescriptions Last Dose Informant Patient Reported? Taking? ALPRAZolam (XANAX) 0.25 mg tablet 5/18/2019 at Unknown time  No Yes   Sig: Take 1 Tab by mouth nightly as needed for Anxiety. Max Daily Amount: 0.25 mg. FLUoxetine (PROZAC) 20 mg capsule 5/16/2019 at Unknown time  No Yes   Sig: Take 1 Cap by mouth daily. busPIRone (BUSPAR) 7.5 mg tablet   Yes Yes   Sig: Take 7.5 mg by mouth three (3) times daily.  Indications: Repeated Episodes of Anxiety      Facility-Administered Medications: None     Rachel Sanchez, PharmD, BCPP, BCPS  Clinical Pharmacy Specialist, [facility]

## 2019-05-21 NOTE — PROGRESS NOTES
Problem: Discharge Planning  Goal: *Discharge to safe environment  Outcome: Progressing Towards Goal  Note:   Patient identifies home as a safe environment. Patient will return home upon discharge. Goal: *Knowledge of medication management  Outcome: Progressing Towards Goal  Note:   Patient verbalizes understanding of current medication regimen. Patient agrees to take all medications as prescribed. Goal: *Knowledge of discharge instructions  Outcome: Progressing Towards Goal  Note:   Patient verbalizes understanding of goals for treatment and safe discharge.

## 2019-05-21 NOTE — BH NOTES
PRN Medication Documentation    Specific patient behavior that led to need for PRN medication: anxiety  Staff interventions attempted prior to PRN being given: therapeutic listening, distraction  PRN medication given: Atarax 50 mg PO; 21 mg Nicoderm CQ left arm  Patient response/effectiveness of PRN medication: pending

## 2019-05-21 NOTE — PROGRESS NOTES
Problem: Anxiety  Goal: *Alleviation of anxiety  Note:   Patient denies SI. Med and meal compliant. Anxious. Goal: to talk to the doctor. Staff goal: to teach coping skills.

## 2019-05-21 NOTE — PROGRESS NOTES
Laboratory Monitoring for Antipsychotics: This patient is currently prescribed the following medication(s):   Current Facility-Administered Medications   Medication Dose Route Frequency    QUEtiapine (SEROquel) tablet 100 mg  100 mg Oral QHS    traZODone (DESYREL) tablet 50 mg  50 mg Oral QHS     The following labs have been completed for monitoring of antipsychotics and/or mood stabilizers:    Height, Weight, BMI Estimation  Estimated body mass index is 32.61 kg/m² as calculated from the following:    Height as of this encounter: 195.6 cm (77\"). Weight as of this encounter: 124.7 kg (275 lb). Vital Signs/Blood Pressure  Visit Vitals  /84 (BP 1 Location: Right arm, BP Patient Position: At rest;Sitting)   Pulse 82   Temp 98 °F (36.7 °C)   Resp 16   Ht 195.6 cm (77\")   Wt 124.7 kg (275 lb)   SpO2 97%   BMI 32.61 kg/m²     Renal Function, Hepatic Function and Chemistry  Estimated Creatinine Clearance: 135.1 mL/min (based on SCr of 1.03 mg/dL). Lab Results   Component Value Date/Time    Sodium 137 05/20/2019 06:59 AM    Potassium 4.0 05/20/2019 06:59 AM    Chloride 106 05/20/2019 06:59 AM    CO2 28 05/20/2019 06:59 AM    Anion gap 3 (L) 05/20/2019 06:59 AM    BUN 13 05/20/2019 06:59 AM    Creatinine 1.03 05/20/2019 06:59 AM    BUN/Creatinine ratio 13 05/20/2019 06:59 AM    Bilirubin, total 0.7 05/20/2019 06:59 AM    Protein, total 7.5 05/20/2019 06:59 AM    Albumin 4.2 05/20/2019 06:59 AM    Globulin 3.3 05/20/2019 06:59 AM    A-G Ratio 1.3 05/20/2019 06:59 AM    ALT (SGPT) 47 05/20/2019 06:59 AM    Alk.  phosphatase 68 05/20/2019 06:59 AM     Lab Results   Component Value Date/Time    Glucose 110 (H) 05/20/2019 06:59 AM     Lab Results   Component Value Date/Time    Hemoglobin A1c 5.6 05/16/2019 11:39 AM     Hematology  Lab Results   Component Value Date/Time    WBC 6.3 05/20/2019 06:59 AM    RBC 4.92 05/20/2019 06:59 AM    HGB 16.1 05/20/2019 06:59 AM    HCT 46.0 05/20/2019 06:59 AM    MCV 93.5 05/20/2019 06:59 AM    MCH 32.7 05/20/2019 06:59 AM    MCHC 35.0 05/20/2019 06:59 AM    RDW 12.1 05/20/2019 06:59 AM    PLATELET 949 96/27/9154 06:59 AM     Lipids  Lab Results   Component Value Date/Time    Cholesterol, total 178 03/26/2019 09:39 AM    HDL Cholesterol 49 03/26/2019 09:39 AM    LDL, calculated 107 (H) 03/26/2019 09:39 AM    Triglyceride 110 03/26/2019 09:39 AM     Thyroid Function  Lab Results   Component Value Date/Time    TSH 2.260 03/26/2019 09:39 AM    T4, Total 6.5 04/01/2019 08:19 AM     Assessment/Plan:  Recommended baseline laboratory monitoring has been completed based on this patient's current medication regimen. The patient's estimated 10-year ASCVD risk is 4%; therefore, the patient is not a candidate for a high-intensity statin. No further intervention needed at this time. Follow-up metabolic monitoring labs should be completed in 3 months (quarterly for the first year of antipsychotic therapy).      Ofelia Junior, PHARMD

## 2019-05-21 NOTE — BH NOTES
PRN Medication Documentation    Specific patient behavior that led to need for PRN medication: LUQ Pain  Staff interventions attempted prior to PRN being given: evaluation  PRN medication given: Tylenol po 650 mg @ 51 [phone]  Patient response/effectiveness of PRN medication: patient resting in bed

## 2019-05-21 NOTE — PROGRESS NOTES
Patient attended spirituality group focused on the topic of self-care and actively participated. : Rev. Praveen Arreola.  Khang; to contact [phone] [address] Baker call: [phone]

## 2019-05-21 NOTE — PROGRESS NOTES
2330: Patient appears to be sleeping. Respirations are even and unlabored with no apparent distress. Will continue to monitor with q15 minute checks throughout the shift. Problem: Falls - Risk of  Goal: *Absence of Falls  Description  Document Mary Beth Sahu Fall Risk and appropriate interventions in the flowsheet.   Outcome: Progressing Towards Goal
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
## 2019-05-21 NOTE — INTERDISCIPLINARY ROUNDS
Behavioral Health Interdisciplinary Rounds Patient Name: Abilio Munguia  Age: 38 y.o. Room/Bed:  746/02 Primary Diagnosis: <principal problem not specified> Admission Status: Voluntary Readmission within 30 days: no 
Power of  in place: no 
Patient requires a blocked bed: no          Reason for blocked bed: N/A 
 
VTE Prophylaxis: Not indicated Mobility needs/Fall risk: no 
Flu Vaccine : no  
Nutritional Plan: no 
Consults:         
Labs/Testing due today?: yes, admission labs Sleep hours: New admission Participation in Care/Groups:  yes Medication Compliant?: New admission; no meds ordered yet PRNS (last 24 hours): Antianxiety, Pain and milk of magnesia Restraints (last 24 hours):  no 
  
CIWA (range last 24 hours): COWS (range last 24 hours): Alcohol screening (AUDIT) completed -   AUDIT Score: 2 If applicable, date SBIRT discussed in treatment team AND documented:  
AUDIT Screen Score: AUDIT Score: 2 Tobacco - patient is a smoker: Have You Used Tobacco in the Past 30 Days: Never a smoker Illegal Drugs use: Have You Used Any Illegal Substances Over the Past 12 Months: Yes 
 
24 hour chart check complete: no  
 
Patient goal(s) for today: meet with treatment team 
Treatment team focus/goals: psychosocial and care plan Progress note: Pt reports increased anxiety LOS:  1  Expected LOS: TBD Financial concerns/prescription coverage: [employer] Date of last family contact: None Family requesting physician contact today:  no 
Discharge plan: Return home Guns in the home: no Outpatient provider(s): Dr. Hallie Yung (PCP) Participating treatment team members: MADDIE Richardson; Dr. Lay Walker MD; Geraldo Mccarthy RN; Lisa Puri, PharmD

## 2019-05-22 VITALS
DIASTOLIC BLOOD PRESSURE: 84 MMHG | RESPIRATION RATE: 16 BRPM | OXYGEN SATURATION: 96 % | TEMPERATURE: 97.5 F | WEIGHT: 275 LBS | HEIGHT: 77 IN | HEART RATE: 81 BPM | BODY MASS INDEX: 32.47 KG/M2 | SYSTOLIC BLOOD PRESSURE: 135 MMHG

## 2019-05-22 PROCEDURE — 74011250637 HC RX REV CODE- 250/637: Performed by: PSYCHIATRY & NEUROLOGY

## 2019-05-22 RX ORDER — HYDROXYZINE 50 MG/1
50 TABLET, FILM COATED ORAL
Status: DISCONTINUED | OUTPATIENT
Start: 2019-05-22 | End: 2019-05-22 | Stop reason: HOSPADM

## 2019-05-22 RX ORDER — CITALOPRAM 10 MG/1
10 TABLET ORAL DAILY
Qty: 30 TAB | Refills: 0 | Status: SHIPPED | OUTPATIENT
Start: 2019-05-22 | End: 2019-06-12 | Stop reason: SDUPTHER

## 2019-05-22 RX ORDER — QUETIAPINE FUMARATE 50 MG/1
50 TABLET, FILM COATED ORAL
Qty: 14 TAB | Refills: 0 | Status: SHIPPED | OUTPATIENT
Start: 2019-05-22 | End: 2019-06-04 | Stop reason: SDUPTHER

## 2019-05-22 RX ORDER — HYDROXYZINE 50 MG/1
50 TABLET, FILM COATED ORAL
Qty: 90 TAB | Refills: 0 | Status: SHIPPED | OUTPATIENT
Start: 2019-05-22 | End: 2019-06-01

## 2019-05-22 RX ADMIN — HYDROXYZINE HYDROCHLORIDE 50 MG: 50 TABLET, FILM COATED ORAL at 12:07

## 2019-05-22 RX ADMIN — HYDROXYZINE HYDROCHLORIDE 50 MG: 50 TABLET, FILM COATED ORAL at 06:34

## 2019-05-22 NOTE — PROGRESS NOTES
Pharmacist Discharge Medication Reconciliation    Discharging Provider: Dr. Darshana Roberts PMH:   Past Medical History:   Diagnosis Date    Anxiety     Nicotine vapor product user     Panic attacks      Chief Complaint for this Admission: No chief complaint on file. Allergies: Diclofenac; Levaquin [levofloxacin]; and Peanut    Discharge Medications:   Current Discharge Medication List        START taking these medications    Details   hydrOXYzine HCl (ATARAX) 50 mg tablet Take 1 Tab by mouth three (3) times daily as needed for Itching (anxiety) for up to 10 days. Indications: anxious  Qty: 90 Tab, Refills: 0      QUEtiapine (SEROQUEL) 50 mg tablet Take 1 Tab by mouth nightly. Indications: mood  Qty: 14 Tab, Refills: 0      citalopram (CELEXA) 10 mg tablet Take 1 Tab by mouth daily.  Indications: depression and anxiety  Qty: 30 Tab, Refills: 0           STOP taking these medications       busPIRone (BUSPAR) 7.5 mg tablet Comments:   Reason for Stopping:         FLUoxetine (PROZAC) 20 mg capsule Comments:   Reason for Stopping:         ALPRAZolam (XANAX) 0.25 mg tablet Comments:   Reason for Stopping:             The patient's chart, MAR and AVS were reviewed by Trinh Browne, PHARMD.

## 2019-05-22 NOTE — BH NOTES
Behavioral Health Transition Record to Provider    Patient Name: Abilio Munguia  YOB: 1975  Medical Record Number: [id number]  Date of Admission: 5/20/2019  Date of Discharge: 5/22/2019    Attending Provider: Cordell Hodges MD  Discharging Provider: Cordell Hodges MD  To contact this individual call [phone] and ask the  to page. If unavailable, ask to be transferred to [facility] Provider on call. [facility] Provider will be available on call 24/7 and during holidays. Primary Care Provider: Ingris Justice MD    Allergies   Allergen Reactions    Diclofenac Swelling     Leg swelling    Levaquin [Levofloxacin] Other (comments)     Pins and needles sensation to arms and legs    Peanut Hives       Reason for Admission: The patient is a 78-year-old  man who is currently admitted with a history of increasing depression over the past several months. He reports that he had been on medications for many years for anxiety and depression and stopped them about a year ago. Admission Diagnosis: Depression [F32.9]    * No surgery found *    Results for orders placed or performed during the hospital encounter of 05/20/19   URINE CULTURE HOLD SAMPLE   Result Value Ref Range    Urine culture hold        URINE ON HOLD IN MICROBIOLOGY DEPT FOR 3 DAYS. IF UNPRESERVED URINE IS SUBMITTED, IT CANNOT BE USED FOR ADDITIONAL TESTING AFTER 24 HRS, RECOLLECTION WILL BE REQUIRED.    CBC WITH AUTOMATED DIFF   Result Value Ref Range    WBC 6.3 4.1 - 11.1 K/uL    RBC 4.92 4.10 - 5.70 M/uL    HGB 16.1 12.1 - 17.0 g/dL    HCT 46.0 36.6 - 50.3 %    MCV 93.5 80.0 - 99.0 FL    MCH 32.7 26.0 - 34.0 PG    MCHC 35.0 30.0 - 36.5 g/dL    RDW 12.1 11.5 - 14.5 %    PLATELET 070 127 - 688 K/uL    MPV 10.2 8.9 - 12.9 FL    NRBC 0.0 0  WBC    ABSOLUTE NRBC 0.00 0.00 - 0.01 K/uL    NEUTROPHILS 57 32 - 75 %    LYMPHOCYTES 33 12 - 49 %    MONOCYTES 7 5 - 13 %    EOSINOPHILS 2 0 - 7 %    BASOPHILS 1 0 - 1 %    IMMATURE GRANULOCYTES 0 0.0 - 0.5 %    ABS. NEUTROPHILS 3.6 1.8 - 8.0 K/UL    ABS. LYMPHOCYTES 2.1 0.8 - 3.5 K/UL    ABS. MONOCYTES 0.5 0.0 - 1.0 K/UL    ABS. EOSINOPHILS 0.1 0.0 - 0.4 K/UL    ABS. BASOPHILS 0.0 0.0 - 0.1 K/UL    ABS. IMM. GRANS. 0.0 0.00 - 0.04 K/UL    DF AUTOMATED     METABOLIC PANEL, COMPREHENSIVE   Result Value Ref Range    Sodium 137 136 - 145 mmol/L    Potassium 4.0 3.5 - 5.1 mmol/L    Chloride 106 97 - 108 mmol/L    CO2 28 21 - 32 mmol/L    Anion gap 3 (L) 5 - 15 mmol/L    Glucose 110 (H) 65 - 100 mg/dL    BUN 13 6 - 20 MG/DL    Creatinine 1.03 0.70 - 1.30 MG/DL    BUN/Creatinine ratio 13 12 - 20      GFR est AA >60 >60 ml/min/1.73m2    GFR est non-AA >60 >60 ml/min/1.73m2    Calcium 9.0 8.5 - 10.1 MG/DL    Bilirubin, total 0.7 0.2 - 1.0 MG/DL    ALT (SGPT) 47 12 - 78 U/L    AST (SGOT) 25 15 - 37 U/L    Alk.  phosphatase 68 45 - 117 U/L    Protein, total 7.5 6.4 - 8.2 g/dL    Albumin 4.2 3.5 - 5.0 g/dL    Globulin 3.3 2.0 - 4.0 g/dL    A-G Ratio 1.3 1.1 - 2.2     LIPASE   Result Value Ref Range    Lipase 206 73 - 393 U/L   URINALYSIS W/MICROSCOPIC   Result Value Ref Range    Color YELLOW/STRAW      Appearance CLEAR CLEAR      Specific gravity 1.008 1.003 - 1.030      pH (UA) 5.5 5.0 - 8.0      Protein NEGATIVE  NEG mg/dL    Glucose NEGATIVE  NEG mg/dL    Ketone 15 (A) NEG mg/dL    Bilirubin NEGATIVE  NEG      Blood NEGATIVE  NEG      Urobilinogen 0.2 0.2 - 1.0 EU/dL    Nitrites NEGATIVE  NEG      Leukocyte Esterase NEGATIVE  NEG      WBC 0-4 0 - 4 /hpf    RBC 0-5 0 - 5 /hpf    Epithelial cells FEW FEW /lpf    Bacteria NEGATIVE  NEG /hpf    Hyaline cast 0-2 0 - 5 /lpf   DRUG SCREEN, URINE   Result Value Ref Range    AMPHETAMINES NEGATIVE  NEG      BARBITURATES NEGATIVE  NEG      BENZODIAZEPINES NEGATIVE  NEG      COCAINE NEGATIVE  NEG      METHADONE NEGATIVE  NEG      OPIATES NEGATIVE  NEG      PCP(PHENCYCLIDINE) NEGATIVE  NEG      THC (TH-CANNABINOL) NEGATIVE  NEG      Drug screen comment (NOTE)    ETHYL ALCOHOL   Result Value Ref Range    ALCOHOL(ETHYL),SERUM <62 <00 MG/DL   SALICYLATE   Result Value Ref Range    Salicylate level <2.2 (L) 2.8 - 20.0 MG/DL   ACETAMINOPHEN   Result Value Ref Range    Acetaminophen level <2 (L) 10 - 30 ug/mL   CK W/ REFLX CKMB   Result Value Ref Range     39 - 308 U/L   EKG, 12 LEAD, INITIAL   Result Value Ref Range    Ventricular Rate 84 BPM    Atrial Rate 84 BPM    P-R Interval 154 ms    QRS Duration 92 ms    Q-T Interval 360 ms    QTC Calculation (Bezet) 425 ms    Calculated P Axis 19 degrees    Calculated R Axis -9 degrees    Calculated T Axis 13 degrees    Diagnosis       Sinus rhythm with sinus arrhythmia  Minimal voltage criteria for LVH, may be normal variant  Borderline ECG    Confirmed by Liset Smith MD., Lodema Schwab ([id number]) on 5/20/2019 8:43:17 AM         Immunizations administered during this encounter: There is no immunization history on file for this patient. Screening for Metabolic Disorders for Patients on Antipsychotic Medications  (Data obtained from the EMR)    Estimated Body Mass Index  Estimated body mass index is 32.61 kg/m² as calculated from the following:    Height as of this encounter: 6' 5\" (1.956 m). Weight as of this encounter: 124.7 kg (275 lb).      Vital Signs/Blood Pressure  Visit Vitals  /84   Pulse 81   Temp 97.5 °F (36.4 °C)   Resp 16   Ht 6' 5\" (1.956 m)   Wt 124.7 kg (275 lb)   SpO2 96%   BMI 32.61 kg/m²       Blood Glucose/Hemoglobin A1c  Lab Results   Component Value Date/Time    Glucose 110 (H) 05/20/2019 06:59 AM       Lab Results   Component Value Date/Time    Hemoglobin A1c 5.6 05/16/2019 11:39 AM        Lipid Panel  Lab Results   Component Value Date/Time    Cholesterol, total 178 03/26/2019 09:39 AM    HDL Cholesterol 49 03/26/2019 09:39 AM    LDL, calculated 107 (H) 03/26/2019 09:39 AM    Triglyceride 110 03/26/2019 09:39 AM        Discharge Diagnosis: Depression (ICD-10-CM: F32.9)    Discharge Plan: Patient discharged home into the care of family. DISCHARGE SUMMARY    NAME:Darrel Boo Notice  : 1975  MRN: [id number]    The patient Russ Joseph exhibits the ability to control behavior in a less restrictive environment. Patient's level of functioning is improving. No assaultive/destructive behavior has been observed for the past 24 hours. No suicidal/homicidal threat or behavior has been observed for the past 24 hours. There is no evidence of serious medication side effects. Patient has not been in physical or protective restraints for at least the past 24 hours. If weapons involved, how are they secured? Patient's mother has secured firearms. Is patient aware of and in agreement with discharge plan? Yes    Arrangements for medication:  Prescriptions sent to [address] [place] [address] on [employer]. Copy of discharge instructions to provider?:  Dr. Nader Quinones ([phone]); Mario Alberto Corrales ([phone])    Arrangements for transportation home:  Family to . Keep all follow up appointments as scheduled, continue to take prescribed medications per physician instructions. Mental health crisis number:  721 or your local mental health crisis line number at [phone]. Discharge Medication List and Instructions:   Current Discharge Medication List      START taking these medications    Details   hydrOXYzine HCl (ATARAX) 50 mg tablet Take 1 Tab by mouth three (3) times daily as needed for Itching (anxiety) for up to 10 days. Indications: anxious  Qty: 90 Tab, Refills: 0      QUEtiapine (SEROQUEL) 50 mg tablet Take 1 Tab by mouth nightly. Indications: mood  Qty: 14 Tab, Refills: 0      citalopram (CELEXA) 10 mg tablet Take 1 Tab by mouth daily.  Indications: depression and anxiety  Qty: 30 Tab, Refills: 0         STOP taking these medications       busPIRone (BUSPAR) 7.5 mg tablet Comments:   Reason for Stopping:         FLUoxetine (PROZAC) 20 mg capsule Comments:   Reason for Stopping:         ALPRAZolam Marlyn Babinski) 0.25 mg tablet Comments:   Reason for Stopping:               Unresulted Labs (24h ago, onward)    None        To obtain results of studies pending at discharge, please contact [phone]    Follow-up Information     Follow up With Specialties Details Why Contact Info    Tulio Obrien MD [place] [address]  [phone]      Domingo Riding  On 6/3/2019 You have a 10:30am appointment for individual therapy. Please arrive 20 minutes early to complete new client paperwork. Remember to bring your photo ID and insurance card. [address] [place] [address]. [address], [address], [address]  [phone]    Salvatore Foley  On 6/20/2019 You have a 2:40pm appointment with the psychiatrist for medication management. Please arrive 20 minutes early to complete new patient paperwork. Remember to bring your photo ID and insurance card. [address] [place] [address] [address] [facility], [address] [place] [address]  [phone]          Advanced Directive:   Does the patient have an appointed surrogate decision maker? No  Does the patient have a Medical Advance Directive? No  Does the patient have a Psychiatric Advance Directive? No  If the patient does not have a surrogate or Medical Advance Directive AND Psychiatric Advance Directive, the patient was offered information on these advance directives Yes and Patient declined to complete    Patient Instructions: Please continue all medications until otherwise directed by physician. Tobacco Cessation Discharge Plan:   Is the patient a smoker and needs referral for smoking cessation? Yes  Patient referred to the following for smoking cessation with an appointment? Refused     Patient was offered medication to assist with smoking cessation at discharge? Refused  Was education for smoking cessation added to the discharge instructions?  Yes    Alcohol/Substance Abuse Discharge Plan: Does the patient have a history of substance/alcohol abuse and requires a referral for treatment? Yes  Patient referred to the following for substance/alcohol abuse treatment with an appointment? Yes, Patient has an appointment with David Heck on 6/3/19 at 10:30am.  Patient was offered medication to assist with alcohol cessation at discharge? Refused  Was education for substance/alcohol abuse added to discharge instructions? Yes    Patient discharged to Home; discussed with patient/caregiver and provided to the patient/caregiver either in hard copy or electronically.
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
## 2019-05-22 NOTE — BH NOTES
GROUP THERAPY PROGRESS NOTE    Asia Amanda  is participating in Reflections Group. Group time: 30 minutes    Personal goal for participation:     Goal orientation: relaxation    Group therapy participation: active    Therapeutic interventions reviewed and discussed: Review basic unit rules and reflect on day. Impression of participation: Responded appropriately to staff and peers.

## 2019-05-22 NOTE — DISCHARGE INSTRUCTIONS
DISCHARGE SUMMARY    NAME:Darrel Zamorano  : 1975  MRN: [id number]    The patient Jay Penn exhibits the ability to control behavior in a less restrictive environment. Patient's level of functioning is improving. No assaultive/destructive behavior has been observed for the past 24 hours. No suicidal/homicidal threat or behavior has been observed for the past 24 hours. There is no evidence of serious medication side effects. Patient has not been in physical or protective restraints for at least the past 24 hours. If weapons involved, how are they secured? Patient's mother has secured firearms. Is patient aware of and in agreement with discharge plan? Yes    Arrangements for medication:  Pres[facility] sent to [facility] on [address]. Copy of discharge instructions to provider?:  Dr. Ebonie Coelho ([phone]); Malinda Raman ([phone])    Arrangements for transportation home:  Family to . Keep all follow up appointments as scheduled, continue to take prescribed medications per physician instructions. Mental health crisis number:  518 or your local mental health crisis line number at [phone]. DISCHARGE SUMMARY from Nurse    PATIENT INSTRUCTIONS:    What to do at Home:  Recommended activity: Activity as tolerated. If you experience any of the following symptoms thoughts of harming yourself or, please follow up with 1 or your local mental health crisis line number at [phone]. .    *  Please give a list of your current medications to your Primary Care Provider. *  Please update this list whenever your medications are discontinued, doses are      changed, or new medications (including over-the-counter products) are added. *  Please carry medication information at all times in case of emergency situations.     These are general instructions for a healthy lifestyle:    No smoking/ No tobacco products/ Avoid exposure to second hand smoke  Surgeon Abran Mota Warning:  Quitting smoking now greatly reduces serious risk to your health. Obesity, smoking, and sedentary lifestyle greatly increases your risk for illness    A healthy diet, regular physical exercise & weight monitoring are important for maintaining a healthy lifestyle    You may be retaining fluid if you have a history of heart failure or if you experience any of the following symptoms:  Weight gain of 3 pounds or more overnight or 5 pounds in a week, increased swelling in our hands or feet or shortness of breath while lying flat in bed. Please call your doctor as soon as you notice any of these symptoms; do not wait until your next office visit. Recognize signs and symptoms of STROKE:    F-face looks uneven    A-arms unable to move or move unevenly    S-speech slurred or non-existent    T-time-call 911 as soon as signs and symptoms begin-DO NOT go       Back to bed or wait to see if you get better-TIME IS BRAIN. Warning Signs of HEART ATTACK     Call 911 if you have these symptoms:   Chest discomfort. Most heart attacks involve discomfort in the center of the chest that lasts more than a few minutes, or that goes away and comes back. It can feel like uncomfortable pressure, squeezing, fullness, or pain.  Discomfort in other areas of the upper body. Symptoms can include pain or discomfort in one or both arms, the back, neck, jaw, or stomach.  Shortness of breath with or without chest discomfort.  Other signs may include breaking out in a cold sweat, nausea, or lightheadedness. Don't wait more than five minutes to call 911 - MINUTES MATTER! Fast action can save your life. Calling 911 is almost always the fastest way to get lifesaving treatment. Emergency Medical Services staff can begin treatment when they arrive -- up to an hour sooner than if someone gets to the hospital by car. The discharge information has been reviewed with the patient. The patient verbalized understanding.   Discharge medications reviewed with the patient and appropriate educational materials and side effects teaching were provided.   ___________________________________________________________________________________________________________________________________

## 2019-05-22 NOTE — PROGRESS NOTES
[employer]  Master Treatment Plan for Abilio Munguia    Date Treatment Plan Initiated: 5/22/19    Treatment Plan Modalities:  Type of Modality Amount  (x minutes) Frequency (x/week) Duration (x days) Name of Responsible Staff   Community & wrap-up meetings to encourage peer interactions 13 7 1   Gianna Roa [facility] psychotherapy to assist in building coping skills and internal controls 60 7 1 Alexei Giron   Therapeutic activity groups to build coping skills 60 7 1 Alexei Giron   Psychoeducation in group setting to address:   Medication education   15 7 [address], RN   Coping skills         Relaxation techniques         Symptom management         Discharge planning   60 2 Sheridan Mendoza 115   60 1 1 [facility]/AA/NA         Physician medication management   15 7 1 Dr. Mary Greer meeting/discharge planning   15 2 [address]                                  Goal will be met by 5/23/19:    Problem: Falls - Risk of  Goal: *Absence of Falls  Description  Document Alanna Coppola Fall Risk and appropriate interventions in the flowsheet. Outcome: Progressing Towards Goal  Note:   Fall Risk Interventions:       Mentation Interventions: Adequate sleep, hydration, pain control    Medication Interventions: Teach patient to arise slowly                   Problem: Patient Education: Go to Patient Education Activity  Goal: Patient/Family Education  Outcome: Progressing Towards Goal     Problem: Depressed Mood (Adult/Pediatric)  Goal: *STG: Participates in treatment plan  Outcome: Progressing Towards Goal  Note:   Pt participated in treatment team. Stated he was \"groggy\" today  Goal: *STG: Attends activities and groups  Outcome: Progressing Towards Goal  Note:   Attending groups on the unit and interacting. Goal: *STG: Remains safe in hospital  Outcome: Progressing Towards Goal  Note:   Remains safe on the unit.  Denies SI/HI at this time.  Goal: *STG: Complies with medication therapy  Outcome: Progressing Towards Goal  Note:   Taking medications as scheduled. Goal: Interventions  Outcome: Progressing Towards Goal     Problem: Patient Education: Go to Patient Education Activity  Goal: Patient/Family Education  Outcome: Progressing Towards Goal     Problem: Anxiety-Behavioral Health (Adult/Pediatric)  Goal: *STG: Demonstrates effective anxiety reduction strategies  Outcome: Progressing Towards Goal  Note:   Pt able to verbalize when he is anxious.   Goal: Interventions  Outcome: Progressing Towards Goal

## 2019-05-22 NOTE — INTERDISCIPLINARY ROUNDS
Behavioral Health Interdisciplinary Rounds Patient Name: Silvia Diallo  Age: 38 y.o. Room/Bed:  746/02 Primary Diagnosis: <principal problem not specified> Admission Status: Voluntary Readmission within 30 days: no 
Power of  in place: no 
Patient requires a blocked bed: no          Reason for blocked bed: n/a 
 
VTE Prophylaxis: Not indicated Mobility needs/Fall risk: no 
Nutritional Plan: no 
Consults:         
Labs/Testing due today?: no 
 
Sleep hours: 7 + Participation in Care/Groups:  yes Medication Compliant?: Yes PRNS (last 24 hours): Antianxiety Restraints (last 24 hours):  no 
  
CIWA (range last 24 hours): COWS (range last 24 hours): Alcohol screening (AUDIT) completed -   AUDIT Score: 2 If applicable, date SBIRT discussed in treatment team AND documented:  
AUDIT Screen Score: AUDIT Score: 2 
 
 
24 hour chart check complete: yes Patient goal(s) for today: Attend all groups Treatment team focus/goals: Provide info on JERRY Progress note: Pt reports feeling groggy from sleep aid; reports liking Atarax; states he is trying to reframe his idea of treatment of mental illness from looking for a \"cure\" to looking to better manage symptoms LOS:  2  Expected LOS: TBD Financial concerns/prescription coverage: [employer] Date of last family contact: None Family requesting physician contact today: No 
Discharge plan: No 
Guns in the home: Yes, asked mom to secure firearms Outpatient provider(s): To be linked Participating treatment team members: MADDIE Cash; Dr. Duyen Miranda MD; Stephon Clark RN; Albertina Eden, Raymond

## 2019-05-22 NOTE — PROGRESS NOTES
Problem: Falls - Risk of  Goal: *Absence of Falls  Description  Document Loomis Beady Fall Risk and appropriate interventions in the flowsheet. Outcome: Progressing Towards Goal  Note:   0030 Pt appears asleep in bed. Respirations even and unlabored. Will monitor with Q 15 safety checks.     0630 PRN Medication Documentation    Specific patient behavior that led to need for PRN medication: anxiety   Staff interventions attempted prior to PRN being given: rest  PRN medication given: Atarax 50 mg po  Patient response/effectiveness of PRN medication: will monitor

## 2019-05-22 NOTE — BH NOTES
GROUP THERAPY PROGRESS NOTE    Asia Amanda is participating in Anger Management. Group time: 50 minutes    Personal goal for participation: To Gain Awareness    Goal orientation: personal    Group therapy participation: active    Therapeutic interventions reviewed and discussed: Anger Management Coping Styles quiz and handout about principles regarding anger    Impression of participation: Patient participated actively although he was out of group to speak with treatment team for a few minutes. Patient was respectful with other patients and interacted with them appropriately. He stated he could identify times when he had not expressed his anger appropriately and the ramifications of that.

## 2019-05-29 ENCOUNTER — OFFICE VISIT (OUTPATIENT)
Dept: FAMILY MEDICINE CLINIC | Age: 44
End: 2019-05-29

## 2019-05-29 VITALS
TEMPERATURE: 98.4 F | DIASTOLIC BLOOD PRESSURE: 78 MMHG | HEIGHT: 77 IN | RESPIRATION RATE: 16 BRPM | HEART RATE: 95 BPM | OXYGEN SATURATION: 98 % | SYSTOLIC BLOOD PRESSURE: 130 MMHG | BODY MASS INDEX: 31.17 KG/M2 | WEIGHT: 264 LBS

## 2019-05-29 DIAGNOSIS — F33.41 RECURRENT MAJOR DEPRESSIVE DISORDER, IN PARTIAL REMISSION (HCC): ICD-10-CM

## 2019-05-29 DIAGNOSIS — K21.00 GASTROESOPHAGEAL REFLUX DISEASE WITH ESOPHAGITIS: Primary | ICD-10-CM

## 2019-05-29 RX ORDER — OMEPRAZOLE 40 MG/1
40 CAPSULE, DELAYED RELEASE ORAL DAILY
Qty: 30 CAP | Refills: 11 | Status: SHIPPED | OUTPATIENT
Start: 2019-05-29 | End: 2020-04-03 | Stop reason: SDUPTHER

## 2019-05-29 NOTE — PROGRESS NOTES
Identified pt with two pt identifiers(name and ). Reviewed record in preparation for visit and have obtained necessary documentation. Chief Complaint   Patient presents with    Abdominal Pain     f/u; pt reports stomach pain kept him up all night    Results     review  labs from ED visit and  labs ordered per Dr. Dale Malone Maintenance Due   Topic    DTaP/Tdap/Td series (1 - Tdap)       Coordination of Care Questionnaire:  :   1) Have you been to an emergency room, urgent care, or hospitalized since your last visit? If yes, where when, and reason for visit? yes   - 1 [facility] for 3 nights for depression    2. Have seen or consulted any other health care provider since your last visit? If yes, where when, and reason for visit? NO      Patient is accompanied by self I have received verbal consent from Go Dunbar to discuss any/all medical information while they are present in the room.

## 2019-05-29 NOTE — PROGRESS NOTES
Shola Cano is a 38 y.o. male who presents today with the following:    HPI  Chief Complaint   Patient presents with    Abdominal Pain     f/u; pt reports stomach pain kept him up all night    Results     review 5/20 labs from ED visit and 5/16 labs ordered per Dr. Evangelina Calvillo   Cortisol labs were not done as the patient went to ER. Patient had a manic episode, suicidal ideation and was advised to go to ER. Patient was admitted to the hospital and was seen by psychiatrist.  He was started on Seroquel and Celexa. After improvement of symptoms patient was discharged. He has follow-up appointments with the psychiatrist and the therapist.  Patient has stopped taking Seroquel due to feeling drowsy and depressed. He also reports that Celexa is making him more nervous, grating teeth, restless legs and have stomach problem. Regarding stomach problem he says he has had this problem before and he feels that if his stomach issue is treated his anxiety will get better. He describes the stomach problem as tingling sensation in the epigastric region which is worse when he is laying down and then it moves to the left upper quadrant with a churning sensation. He has tried AcipHex in the past which improved his symptoms. Regarding his manic and depressive episode patient is not suicidal or homicidal at this time. He says he was diagnosed with somatic stress disorder in the hospital.    Review of Systems   Constitutional: Negative. HENT: Negative. Eyes: Negative. Respiratory: Negative. Cardiovascular: Negative. Gastrointestinal: Positive for abdominal pain and heartburn. Genitourinary: Negative. Musculoskeletal: Negative. Skin: Negative. Neurological: Negative. Endo/Heme/Allergies: Negative. Psychiatric/Behavioral: Negative. Physical Exam   Constitutional: He is oriented to person, place, and time and well-developed, well-nourished, and in no distress.    HENT:   Head: Normocephalic and atraumatic. Right Ear: External ear normal.   Left Ear: External ear normal.   Nose: Nose normal.   Mouth/Throat: No oropharyngeal exudate. Eyes: Conjunctivae are normal.   Neck: Normal range of motion. Neck supple. No thyromegaly present. Cardiovascular: Normal rate and regular rhythm. Pulmonary/Chest: Effort normal and breath sounds normal.   Abdominal: Soft. Bowel sounds are normal. He exhibits no distension. There is no tenderness. Musculoskeletal: Normal range of motion. He exhibits no edema. Lymphadenopathy:     He has no cervical adenopathy. Neurological: He is alert and oriented to person, place, and time. Skin: Skin is warm and dry. Psychiatric: Affect normal. His mood appears anxious. He does not exhibit a depressed mood. He expresses no homicidal and no suicidal ideation. He expresses no suicidal plans and no homicidal plans. Nursing note and vitals reviewed. /78   Pulse 95   Temp 98.4 °F (36.9 °C) (Oral)   Resp 16   Ht 6' 5\" (1.956 m)   Wt 264 lb (119.7 kg)   SpO2 98%   BMI 31.31 kg/m²     Allergies   Allergen Reactions    Fluoxetine Other (comments)     Induced love    Diclofenac Swelling     Leg swelling    Levaquin [Levofloxacin] Other (comments)     Pins and needles sensation to arms and legs    Peanut Hives       Current Outpatient Medications   Medication Sig    omeprazole (PRILOSEC) 40 mg capsule Take 1 Cap by mouth daily.  citalopram (CELEXA) 10 mg tablet Take 1 Tab by mouth daily. Indications: depression and anxiety    hydrOXYzine HCl (ATARAX) 50 mg tablet Take 1 Tab by mouth three (3) times daily as needed for Itching (anxiety) for up to 10 days. Indications: anxious    QUEtiapine (SEROQUEL) 50 mg tablet Take 1 Tab by mouth nightly. Indications: mood     No current facility-administered medications for this visit.         Past Medical History:   Diagnosis Date    Anxiety     Nicotine vapor product user     Panic attacks        No past surgical history on file. No results found for this visit on 05/29/19.      1. Gastroesophageal reflux disease with esophagitis  Patient has tried AcipHex in the past.  - omeprazole (PRILOSEC) 40 mg capsule; Take 1 Cap by mouth daily. Dispense: 30 Cap; Refill: 11    2. Recurrent major depressive disorder, in partial remission [facility])  Patient has a follow-up appointment with neurologist Dr. Cinthya Hennessy on 6/20. He also has appointment for psychotherapy on 6/3. Patient is requesting our office to call psychiatrist for possible earlier appointment. Follow-up and Dispositions    · Return in about 12 days (around 6/10/2019) for follow up. I have discussed the diagnosis with the patient and the intended plan as seen in the above orders. The patient has received an after-visit summary and questions were answered concerning future plans. I have discussed medication side effects and warnings with the patient as well. The patient agrees and understands above plan.            Brien Lane MD

## 2019-06-05 RX ORDER — QUETIAPINE FUMARATE 50 MG/1
50 TABLET, FILM COATED ORAL
Qty: 14 TAB | Refills: 0 | Status: SHIPPED | OUTPATIENT
Start: 2019-06-05 | End: 2019-06-12 | Stop reason: SDUPTHER

## 2019-06-10 ENCOUNTER — OFFICE VISIT (OUTPATIENT)
Dept: FAMILY MEDICINE CLINIC | Age: 44
End: 2019-06-10

## 2019-06-10 VITALS
HEIGHT: 77 IN | DIASTOLIC BLOOD PRESSURE: 84 MMHG | BODY MASS INDEX: 31.05 KG/M2 | TEMPERATURE: 98.4 F | SYSTOLIC BLOOD PRESSURE: 128 MMHG | OXYGEN SATURATION: 99 % | RESPIRATION RATE: 16 BRPM | WEIGHT: 263 LBS | HEART RATE: 88 BPM

## 2019-06-10 DIAGNOSIS — M25.552 PAIN OF LEFT HIP JOINT: Primary | ICD-10-CM

## 2019-06-10 NOTE — PROGRESS NOTES
Identified pt with two pt identifiers(name and ). Reviewed record in preparation for visit and have obtained necessary documentation. Chief Complaint   Patient presents with    GERD     2wk f/u    Medication Evaluation     prilosex- pt reports much improvement since starting taking last visit        Health Maintenance Due   Topic    DTaP/Tdap/Td series (1 - Tdap)       Coordination of Care Questionnaire:  :   1) Have you been to an emergency room, urgent care, or hospitalized since your last visit? If yes, where when, and reason for visit? no     2. Have seen or consulted any other health care provider since your last visit? If yes, where when, and reason for visit? NO    Patient is accompanied by self I have received verbal consent from Edy Snow to discuss any/all medical information while they are present in the room.

## 2019-06-10 NOTE — PROGRESS NOTES
Skyler Stover is a 38 y.o. male who presents today with the following:    HPI  Chief Complaint   Patient presents with    GERD     2wk f/u    Medication Evaluation     prilosex- pt reports much improvement since starting taking last visit   Patient is here for follow-up of GERD. He is taking Prilosec 40 mg daily. He says that he immediately felt better after taking this medication. He now complains of low back pain which really radiates mostly to the left side. He also feels that certain movements of his hip joint on the left side causes him to have left-sided inguinal pain. He wants to get that evaluated further. Patient is taking Seroquel every night. It makes him hard to wake up in the morning but he feels better when he takes it at night and it makes him sleep well. Patient's mood is better on Seroquel. Patient says that Seroquel caused him to have low motivation but it helps him go to sleep. He has decreased his vaping and he is weaning off and has weaned off to 3 mg strength. He wears nicotine patches. He has been to psychotherapy sessions and feels good about it. He has an appointment with psychotherapy today. Patient is currently nonsuicidal and non-homicidal.    Review of Systems   Constitutional: Negative. HENT: Negative. Eyes: Negative. Respiratory: Negative. Cardiovascular: Negative. Gastrointestinal: Negative. Genitourinary: Negative. Musculoskeletal: Positive for back pain and joint pain. Skin: Negative. Neurological: Negative. Endo/Heme/Allergies: Negative. Psychiatric/Behavioral: Positive for depression. Negative for suicidal ideas. Physical Exam   Constitutional: He is oriented to person, place, and time and well-developed, well-nourished, and in no distress. HENT:   Head: Normocephalic and atraumatic. Right Ear: External ear normal.   Left Ear: External ear normal.   Nose: Nose normal.   Mouth/Throat: No oropharyngeal exudate.    Eyes: Conjunctivae are normal.   Neck: Normal range of motion. Neck supple. No thyromegaly present. Cardiovascular: Normal rate and regular rhythm. Pulmonary/Chest: Effort normal and breath sounds normal.   Abdominal: Soft. Bowel sounds are normal. He exhibits no distension. There is no tenderness. Musculoskeletal: Normal range of motion. He exhibits no edema. Left hip: Normal.        Lumbar back: He exhibits pain. He exhibits normal range of motion and no tenderness. Pain on certain movements of left hip joint. Lymphadenopathy:     He has no cervical adenopathy. Neurological: He is alert and oriented to person, place, and time. Skin: Skin is warm and dry. Psychiatric: Mood and affect normal.   Nursing note and vitals reviewed. /84   Pulse 88   Temp 98.4 °F (36.9 °C) (Oral)   Resp 16   Ht 6' 5\" (1.956 m)   Wt 263 lb (119.3 kg)   SpO2 99%   BMI 31.19 kg/m²     Allergies   Allergen Reactions    Fluoxetine Other (comments)     Induced love    Diclofenac Swelling     Leg swelling    Levaquin [Levofloxacin] Other (comments)     Pins and needles sensation to arms and legs    Naproxen Swelling     Leg swelling    Peanut Hives       Current Outpatient Medications   Medication Sig    QUEtiapine (SEROQUEL) 50 mg tablet Take 1 Tab by mouth nightly. Indications: mood    omeprazole (PRILOSEC) 40 mg capsule Take 1 Cap by mouth daily.  citalopram (CELEXA) 10 mg tablet Take 1 Tab by mouth daily. Indications: depression and anxiety     No current facility-administered medications for this visit. Past Medical History:   Diagnosis Date    Anxiety     Nicotine vapor product user     Panic attacks        No past surgical history on file. No results found for this visit on 06/10/19. 1. Pain of left hip joint  Patient feels that he cannot take Naprosyn/Voltaren/Motrin 800 mg due to side effect of swelling. Patient is able to take ibuprofen 200 mg OTC.   I will check x-rays and refer to physical therapy at this time. If not improved by physical therapy I will refer to [facility] for further evaluation.  - REFERRAL TO PHYSICAL THERAPY  - XR HIP LT W OR WO PELV 2-3 VWS; Future  - XR SPINE LUMB 2 OR 3 V; Future        Follow-up and Dispositions    · Return in about 3 months (around 9/10/2019), or if symptoms worsen or fail to improve. I have discussed the diagnosis with the patient and the intended plan as seen in the above orders. The patient has received an after-visit summary and questions were answered concerning future plans. I have discussed medication side effects and warnings with the patient as well. The patient agrees and understands above plan.            Miguel Calvert MD

## 2019-06-13 ENCOUNTER — HOSPITAL ENCOUNTER (OUTPATIENT)
Dept: GENERAL RADIOLOGY | Age: 44
Discharge: HOME | End: 2019-06-13
Payer: COMMERCIAL

## 2019-06-13 DIAGNOSIS — M25.552 PAIN OF LEFT HIP JOINT: ICD-10-CM

## 2019-06-13 PROCEDURE — 72100 X-RAY EXAM L-S SPINE 2/3 VWS: CPT

## 2019-06-13 PROCEDURE — 73502 X-RAY EXAM HIP UNI 2-3 VIEWS: CPT

## 2020-02-13 ENCOUNTER — HOSPITAL ENCOUNTER (EMERGENCY)
Age: 45
Discharge: HOME | End: 2020-02-13
Attending: EMERGENCY MEDICINE
Payer: COMMERCIAL

## 2020-02-13 VITALS
SYSTOLIC BLOOD PRESSURE: 151 MMHG | OXYGEN SATURATION: 97 % | HEART RATE: 100 BPM | DIASTOLIC BLOOD PRESSURE: 99 MMHG | TEMPERATURE: 98.4 F | BODY MASS INDEX: 32.4 KG/M2 | WEIGHT: 280 LBS | RESPIRATION RATE: 18 BRPM | HEIGHT: 78 IN
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
## 2020-02-13 PROCEDURE — 90715 TDAP VACCINE 7 YRS/> IM: CPT | Performed by: EMERGENCY MEDICINE

## 2020-02-13 PROCEDURE — 74011250636 HC RX REV CODE- 250/636: Performed by: EMERGENCY MEDICINE

## 2020-02-13 PROCEDURE — 99283 EMERGENCY DEPT VISIT LOW MDM: CPT

## 2020-02-13 PROCEDURE — 90471 IMMUNIZATION ADMIN: CPT

## 2020-02-13 RX ORDER — AMOXICILLIN AND CLAVULANATE POTASSIUM 875; 125 MG/1; MG/1
1 TABLET, FILM COATED ORAL 2 TIMES DAILY
Qty: 20 TAB | Refills: 0 | Status: SHIPPED | OUTPATIENT
Start: 2020-02-13 | End: 2020-02-23

## 2020-02-13 RX ADMIN — TETANUS TOXOID, REDUCED DIPHTHERIA TOXOID AND ACELLULAR PERTUSSIS VACCINE, ADSORBED 0.5 ML: 5; 2.5; 8; 8; 2.5 SUSPENSION INTRAMUSCULAR at 20:23

## 2020-02-14 NOTE — ED PROVIDER NOTES
38 y.o. male with past medical history significant for Anxiety who presents from home with chief complaint of woundes. Patient states that he was \"pulling up to his house when he saw 2 dogs fighting and he tried to get the dogs apart, however, he was bit in multiple places by the dogs\" prompting his visit to the ED today. Patient states that he wiped all the wounds with Iodine. Patient states that \"he flushed his puncture wound on the left wrist with Hydrogen Peroxide and Neosporin PTA. \" Patient presents to [facility] ED with complaints of wounds described as dog bites. Patient endorses pain at the site of wounds. Of note, patient's tetanus shot is not UTD. Patient denies joint swelling. There are no other acute medical concerns at this time. Social hx: No tobacco use endorses \"vaping\" , (+) EtOH use, illicit drug use (Marijuana)  PCP: Elpidio Bhakta MD      Note written by Vijaya Esquivel, as dictated by Betsy Diggs MD 8:14 PM               Past Medical History:   Diagnosis Date    Anxiety     Nicotine vapor product user     Panic attacks        History reviewed. No pertinent surgical history.       Family History:   Problem Relation Age of Onset    No Known Problems Mother     No Known Problems Father     Suicide Maternal Aunt        Social History     Socioeconomic History    Marital status: SINGLE     Spouse name: Not on file    Number of children: Not on file    Years of education: Not on file    Highest education level: High school graduate   Occupational History    Occupation: self eplomyed     Comment:    Social Needs    Financial resource strain: Not very hard    Food insecurity:     Worry: Patient refused     Inability: Patient refused    Transportation needs:     Medical: Patient refused     Non-medical: Patient refused   Tobacco Use    Smoking status: Never Smoker    Smokeless tobacco: Current User    Tobacco comment: Vaping    Substance and Sexual Activity    Alcohol use: Yes     Comment: patient reports 1-2 drinks a year    Drug use: Yes     Types: Marijuana     Comment: rarely     Sexual activity: Not Currently   Lifestyle    Physical activity:     Days per week: Patient refused     Minutes per session: Patient refused    Stress: To some extent   Relationships    Social connections:     Talks on phone: Patient refused     Gets together: Patient refused     Attends Sikh service: Patient refused     Active member of club or organization: Patient refused     Attends meetings of clubs or organizations: Patient refused     Relationship status: Patient refused    Intimate partner violence:     Fear of current or ex partner: Patient refused     Emotionally abused: Patient refused     Physically abused: Patient refused     Forced sexual activity: Patient refused   Other Topics Concern     Service No    Blood Transfusions No    Caffeine Concern No    Occupational Exposure No    Hobby Hazards No    Sleep Concern No    Stress Concern No    Weight Concern No    Special Diet No    Back Care No    Exercise No    Bike Helmet No    Seat Belt No    Self-Exams No   Social History Narrative    Not on file         ALLERGIES: Fluoxetine; Diclofenac; Levaquin [levofloxacin]; Naproxen; and Peanut    Review of Systems   Constitutional: Negative for fever. Eyes: Negative for visual disturbance. Respiratory: Negative for cough, shortness of breath and wheezing. Cardiovascular: Negative for chest pain and leg swelling. Gastrointestinal: Negative for abdominal pain, diarrhea, nausea and vomiting. Genitourinary: Negative for dysuria. Musculoskeletal: Negative. Negative for back pain, joint swelling and neck stiffness. Skin: Positive for wound. Negative for rash. Neurological: Negative. Negative for syncope and headaches. Psychiatric/Behavioral: Negative for confusion. All other systems reviewed and are negative.       Vitals:    02/13/20 1952   BP: (!) 151/99   Pulse: 100   Resp: 18   Temp: 98.4 °F (36.9 °C)   SpO2: 97%   Weight: 127 kg (280 lb)   Height: 6' 7\" (2.007 m)            Physical Exam  Constitutional:       General: He is not in acute distress. Appearance: He is well-developed. HENT:      Head: Normocephalic. Neck:      Musculoskeletal: Normal range of motion. Cardiovascular:      Rate and Rhythm: Normal rate. Pulmonary:      Effort: Pulmonary effort is normal. No respiratory distress. Musculoskeletal: Normal range of motion. Comments: Abrasions to the left forearm, extensor aspect. Puncture wound to the left wrist. Bite and abrasions on the right bicep. Skin:     General: Skin is warm and dry. Capillary Refill: Capillary refill takes less than 2 seconds. Neurological:      Mental Status: He is alert and oriented to person, place, and time.    Psychiatric:         Behavior: Behavior normal.      Note written by Vijaya Holt, as dictated by Fabi Jessica MD 8:14 PM       MDM       Procedures

## 2020-02-14 NOTE — DISCHARGE INSTRUCTIONS
Patient Education        Animal Bites: Care Instructions  Your Care Instructions  After an animal bite, the biggest concern is infection. The chance of infection depends on the type of animal that bit you, where on your body you were bitten, and your general health. Many animal bites are not closed with stitches, because this can increase the chance of infection. Your bite may take as little as 7 days or as long as several months to heal, depending on how bad it is. Taking good care of your wound at home will help it heal and reduce your chance of infection. The doctor has checked you carefully, but problems can develop later. If you notice any problems or new symptoms, get medical treatment right away. Follow-up care is a key part of your treatment and safety. Be sure to make and go to all appointments, and call your doctor if you are having problems. It's also a good idea to know your test results and keep a list of the medicines you take. How can you care for yourself at home? · If your doctor told you how to care for your wound, follow your doctor's instructions. If you did not get instructions, follow this general advice:  ? After 24 to 48 hours, gently wash the wound with clean water 2 times a day. Do not scrub or soak the wound. Don't use hydrogen peroxide or alcohol, which can slow healing. ? You may cover the wound with a thin layer of petroleum jelly, such as Vaseline, and a nonstick bandage. ? Apply more petroleum jelly and replace the bandage as needed. · After you shower, gently dry the wound with a clean towel. · If your doctor has closed the wound, cover the bandage with a plastic bag before you take a shower. · A small amount of skin redness and swelling around the wound edges and the stitches or staples is normal. Your wound may itch or feel irritated. Do not scratch or rub the wound.   · Ask your doctor if you can take an over-the-counter pain medicine, such as acetaminophen (Tylenol), ibuprofen (Advil, Motrin), or naproxen (Aleve). Read and follow all instructions on the label. · Do not take two or more pain medicines at the same time unless the doctor told you to. Many pain medicines have acetaminophen, which is Tylenol. Too much acetaminophen (Tylenol) can be harmful. · If your bite puts you at risk for rabies, you will get a series of shots over the next few weeks to prevent rabies. Your doctor will tell you when to get the shots. It is very important that you get the full cycle of shots. Follow your doctor's instructions exactly. · You may need a tetanus shot if you have not received one in the last 5 years. · If your doctor prescribed antibiotics, take them as directed. Do not stop taking them just because you feel better. You need to take the full course of antibiotics. When should you call for help? Call your doctor now or seek immediate medical care if:    · The skin near the bite turns cold or pale or it changes color.     · You lose feeling in the area near the bite, or it feels numb or tingly.     · You have trouble moving a limb near the bite.     · You have symptoms of infection, such as:  ? Increased pain, swelling, warmth, or redness near the wound. ? Red streaks leading from the wound. ? Pus draining from the wound. ? A fever.     · Blood soaks through the bandage. Oozing small amounts of blood is normal.     · Your pain is getting worse.    Watch closely for changes in your health, and be sure to contact your doctor if you are not getting better as expected. Where can you learn more? Go to http://ladan-david.info/. Enter X242 in the search box to learn more about \"Animal Bites: Care Instructions. \"  Current as of: June 26, 2019  Content Version: 12.2  © 2257-7916 [employer]. Care instructions adapted under license by [employer] (which disclaims liability or warranty for this information).  If you have questions about a medical condition or this instruction, always ask your healthcare professional. [place] [address] any warranty or liability for your use of this information.

## 2020-02-14 NOTE — ED NOTES
Cleaned wounds and applied Nonstick dressing to left wrsit over puncture wound and dog bite on right upper arm, secured with coban

## 2020-02-14 NOTE — ED TRIAGE NOTES
Triage: Broke up a dog fight today being a good [facility]. Has a laceration to the left wrist, abrasion/brusing to the right upper arm. Rabies is up to date on the dogs. Tetanus is not up to date. [facility].

## 2020-04-03 DIAGNOSIS — K21.00 GASTROESOPHAGEAL REFLUX DISEASE WITH ESOPHAGITIS: ICD-10-CM

## 2020-04-03 NOTE — TELEPHONE ENCOUNTER
Pt is requesting a 90 day supply due to [place]. Pt is not requesting appt but have scheduled just in case on is needed. Appointment has been scheduled on Monday.

## 2020-04-06 ENCOUNTER — VIRTUAL VISIT (OUTPATIENT)
Dept: FAMILY MEDICINE CLINIC | Age: 45
End: 2020-04-06

## 2020-04-06 ENCOUNTER — TELEPHONE (OUTPATIENT)
Dept: FAMILY MEDICINE CLINIC | Age: 45
End: 2020-04-06

## 2020-04-06 VITALS — BODY MASS INDEX: 31.24 KG/M2 | HEIGHT: 78 IN | WEIGHT: 270 LBS

## 2020-04-06 DIAGNOSIS — K21.00 GASTROESOPHAGEAL REFLUX DISEASE WITH ESOPHAGITIS: ICD-10-CM

## 2020-04-06 RX ORDER — OMEPRAZOLE 40 MG/1
40 CAPSULE, DELAYED RELEASE ORAL DAILY
Qty: 30 CAP | Refills: 11 | Status: SHIPPED | OUTPATIENT
Start: 2020-04-06 | End: 2020-04-06 | Stop reason: SDUPTHER

## 2020-04-06 RX ORDER — OMEPRAZOLE 40 MG/1
40 CAPSULE, DELAYED RELEASE ORAL DAILY
Qty: 90 CAP | Refills: 1 | Status: SHIPPED | OUTPATIENT
Start: 2020-04-06 | End: 2020-09-10 | Stop reason: SDUPTHER

## 2020-04-06 NOTE — PROGRESS NOTES
Identified pt with two pt identifiers(name and ). Reviewed record in preparation for visit and have obtained necessary documentation. Chief Complaint   Patient presents with    Medication Refill     prilosec        There are no preventive care reminders to display for this patient. Visit Vitals  Height 6' 7\" (2.007 m)   Weight 270 lb (122.5 kg)   Body Mass Index 30.42 kg/m²         Coordination of Care Questionnaire:  :   1) Have you been to an emergency room, urgent care, or hospitalized since your last visit? YES      2. Have seen or consulted any other health care provider since your last visit? YES [facility] 50 [phone]-              Patient is accompanied by SELF I have received verbal consent from Noemi Montoya to discuss any/all medical information while they are present in the room.

## 2020-09-10 DIAGNOSIS — K21.00 GASTROESOPHAGEAL REFLUX DISEASE WITH ESOPHAGITIS: ICD-10-CM

## 2020-09-10 RX ORDER — OMEPRAZOLE 40 MG/1
40 CAPSULE, DELAYED RELEASE ORAL DAILY
Qty: 90 CAP | Refills: 1 | Status: SHIPPED | OUTPATIENT
Start: 2020-09-10 | End: 2020-09-16 | Stop reason: SDUPTHER

## 2020-09-16 ENCOUNTER — TELEPHONE (OUTPATIENT)
Dept: FAMILY MEDICINE CLINIC | Age: 45
End: 2020-09-16

## 2020-09-16 DIAGNOSIS — K21.00 GASTROESOPHAGEAL REFLUX DISEASE WITH ESOPHAGITIS: ICD-10-CM

## 2020-09-16 NOTE — TELEPHONE ENCOUNTER
Patient needs a PA for insurance   [employer]  Medication \" omeprazole 40 mg \" 90 tab 1 refill;   Phone for PA [phone] PA can be done over phone  Or fax [phone]

## 2020-09-17 RX ORDER — OMEPRAZOLE 40 MG/1
40 CAPSULE, DELAYED RELEASE ORAL DAILY
Qty: 90 CAP | Refills: 1 | Status: SHIPPED | OUTPATIENT
Start: 2020-09-17 | End: 2021-02-17 | Stop reason: SDUPTHER

## 2021-02-17 ENCOUNTER — TELEPHONE (OUTPATIENT)
Dept: FAMILY MEDICINE CLINIC | Age: 46
End: 2021-02-17

## 2021-02-17 DIAGNOSIS — K21.00 GASTROESOPHAGEAL REFLUX DISEASE WITH ESOPHAGITIS: ICD-10-CM

## 2021-02-17 NOTE — TELEPHONE ENCOUNTER
PCP: Marlyn Jordan MD    Last appt: Visit date not found  No future appointments. Requested Prescriptions     Pending Prescriptions Disp Refills    omeprazole (PRILOSEC) 40 mg capsule 90 Cap 1     Sig: Take 1 Cap by mouth daily.        Prior labs and Blood pressures:  BP Readings from Last 3 Encounters:   02/13/20 (!) 151/99   06/10/19 128/84   05/29/19 130/78     Lab Results   Component Value Date/Time    Sodium 137 05/20/2019 06:59 AM    Potassium 4.0 05/20/2019 06:59 AM    Chloride 106 05/20/2019 06:59 AM    CO2 28 05/20/2019 06:59 AM    Anion gap 3 (L) 05/20/2019 06:59 AM    Glucose 110 (H) 05/20/2019 06:59 AM    BUN 13 05/20/2019 06:59 AM    Creatinine 1.03 05/20/2019 06:59 AM    BUN/Creatinine ratio 13 05/20/2019 06:59 AM    GFR est AA >60 05/20/2019 06:59 AM    GFR est non-AA >60 05/20/2019 06:59 AM    Calcium 9.0 05/20/2019 06:59 AM     Lab Results   Component Value Date/Time    Hemoglobin A1c 5.6 05/16/2019 11:39 AM     Lab Results   Component Value Date/Time    Cholesterol, total 178 03/26/2019 09:39 AM    HDL Cholesterol 49 03/26/2019 09:39 AM    LDL, calculated 107 (H) 03/26/2019 09:39 AM    VLDL, calculated 22 03/26/2019 09:39 AM    Triglyceride 110 03/26/2019 09:39 AM     Lab Results   Component Value Date/Time    VITAMIN D, 25-HYDROXY 32.8 03/26/2019 09:39 AM       Lab Results   Component Value Date/Time    TSH 2.260 03/26/2019 09:39 AM

## 2021-02-18 RX ORDER — OMEPRAZOLE 40 MG/1
40 CAPSULE, DELAYED RELEASE ORAL DAILY
Qty: 90 CAP | Refills: 1 | Status: SHIPPED | OUTPATIENT
Start: 2021-02-18 | End: 2021-12-05

## 2021-03-09 ENCOUNTER — TELEPHONE (OUTPATIENT)
Dept: FAMILY MEDICINE CLINIC | Age: 46
End: 2021-03-09

## 2021-03-09 NOTE — TELEPHONE ENCOUNTER
Placed PA for Omeprazole in Dr. Leda Olszewski in box. Submitted orignially to Covermymeds but wasn't successful.

## 2021-03-15 ENCOUNTER — TELEPHONE (OUTPATIENT)
Dept: FAMILY MEDICINE CLINIC | Age: 46
End: 2021-03-15

## 2021-12-03 DIAGNOSIS — K21.00 GASTROESOPHAGEAL REFLUX DISEASE WITH ESOPHAGITIS: ICD-10-CM

## 2021-12-05 RX ORDER — OMEPRAZOLE 40 MG/1
CAPSULE, DELAYED RELEASE ORAL
Qty: 90 CAPSULE | Refills: 1 | Status: SHIPPED
Start: 2021-12-05 | End: 2022-05-18

## 2022-03-20 PROBLEM — F32.A DEPRESSION: Status: ACTIVE | Noted: 2019-05-20

## 2022-05-12 ENCOUNTER — NURSE TRIAGE (OUTPATIENT)
Dept: OTHER | Facility: CLINIC | Age: 47
End: 2022-05-12

## 2022-05-12 NOTE — TELEPHONE ENCOUNTER
Received call from Emanuel at [facility] with [employer] Complaint. Subjective: Caller states \"Have cold symptoms would\"     Current Symptoms:   Splitting H/A  Sweating  Decreased appetite  SOB with going up the stairs  Slight ocasional cough    Onset: 4 days ago; gradual    Associated Symptoms: reduced activity, reduced appetite    Pain Severity: 0/10 now, Sunday the H/A was severe    Temperature: 98.2 oral     What has been tried: Saray taras cold and flu - somewhat helpful  Last time any was taken was 1200 yesterday  Last night tried to sleep without any medications    LMP: NA Pregnant: NA    Recommended disposition: Herson Chavis [id number] advice provided, patient verbalizes understanding; denies any other questions or concerns; instructed to call back for any new or worsening symptoms. Home Care    Attention Provider: Thank you for allowing me to participate in the care of your patient. The patient was connected to triage in response to information provided to the [facility]. Please do not respond through this encounter as the response is not directed to a shared pool.     Reason for Disposition   Colds with no complications    Protocols used: COMMON COLD-ADULT-[place]

## 2022-05-18 ENCOUNTER — VIRTUAL VISIT (OUTPATIENT)
Dept: FAMILY MEDICINE CLINIC | Age: 47
End: 2022-05-18
Payer: COMMERCIAL

## 2022-05-18 DIAGNOSIS — J01.10 SUBACUTE FRONTAL SINUSITIS: Primary | ICD-10-CM

## 2022-05-18 PROCEDURE — 99203 OFFICE O/P NEW LOW 30 MIN: CPT | Performed by: FAMILY MEDICINE

## 2022-05-18 RX ORDER — VORTIOXETINE 10 MG/1
TABLET, FILM COATED ORAL
COMMUNITY
Start: 2022-04-15

## 2022-05-18 RX ORDER — BENZONATATE 100 MG/1
100 CAPSULE ORAL
Qty: 20 CAPSULE | Refills: 0 | Status: SHIPPED | OUTPATIENT
Start: 2022-05-18 | End: 2022-05-25

## 2022-05-18 RX ORDER — AZITHROMYCIN 250 MG/1
TABLET, FILM COATED ORAL
Qty: 6 TABLET | Refills: 0 | Status: SHIPPED | OUTPATIENT
Start: 2022-05-18 | End: 2022-05-23

## 2022-05-18 NOTE — PROGRESS NOTES
Consent: Jennifer Marquez, who was seen by synchronous (real-time) audio-video technology, and/or his healthcare decision maker, is aware that this patient-initiated, Telehealth encounter on 5/18/2022 is a billable service, with coverage as determined by his insurance carrier. He is aware that he may receive a bill and has provided verbal consent to proceed: Yes. Assessment & Plan:   Diagnoses and all orders for this visit:    1. Subacute frontal sinusitis  -     azithromycin (ZITHROMAX) 250 mg tablet; Take 2 tablets today, then take 1 tablet daily  -     benzonatate (TESSALON) 100 mg capsule; Take 1 Capsule by mouth three (3) times daily as needed for Cough for up to 7 days. Follow-up and Dispositions    · Return if symptoms worsen or fail to improve. I ADVISED PATIENT TO GO TO ER IF SYMPTOMS WORSEN , CHANGE OR FAILS TO IMPROVE. I spent at least 15 minutes with this established patient, and >50% of the time was spent counseling and/or coordinating care regarding SEE BELOW  712  Subjective:   Jennifer Marquez is a 56 y.o. 1975 male  established patient, who was seen for Cough, Headache, Chills, Fever, and Diarrhea          1. Subacute frontal sinusitis  Jennifer Marquez is a 56 y.o. male who complains of recent onset of nasal congestion, sinus pressure, cough , sputum production,. Patient denies shortness of breath, wheezing,  sore throat, ear fullness and body aches . Patient also noted that OTC remedies did not help. Denies fever  I have also advised patient to get tested for COVID-19. Prior to Admission medications    Medication Sig Start Date End Date Taking?  Authorizing Provider   Trintellix 10 mg tablet  4/15/22  Yes Provider, Historical   azithromycin (ZITHROMAX) 250 mg tablet Take 2 tablets today, then take 1 tablet daily 5/18/22 5/23/22 Yes Carolyne Velasquez MD   benzonatate (TESSALON) 100 mg capsule Take 1 Capsule by mouth three (3) times daily as needed for Cough for up to 7 days. 5/18/22 5/25/22 Yes Mely Delgadillo MD   omeprazole (PRILOSEC) 40 mg capsule TAKE ONE CAPSULE BY MOUTH DAILY  Patient not taking: Reported on 5/18/2022 12/5/21 5/18/22  Mely Delgadillo MD   QUEtiapine (SEROQUEL) 50 mg tablet Take 1 Tab by mouth nightly. Indications: mood  Patient not taking: Reported on 5/18/2022 6/12/19 5/18/22  Mely Delgadillo MD   citalopram (CELEXA) 10 mg tablet Take 1 Tab by mouth daily. Indications: depression and anxiety  Patient not taking: Reported on 5/18/2022 6/12/19 5/18/22  Mely Delgadillo MD     Allergies   Allergen Reactions    Fluoxetine Other (comments)     Induced love    Diclofenac Swelling     Leg swelling    Levaquin [Levofloxacin] Other (comments)     Pins and needles sensation to arms and legs    Naproxen Swelling     Leg swelling    Peanut Hives       Patient Active Problem List   Diagnosis Code    Depression F32. A     Patient Active Problem List    Diagnosis Date Noted    Depression 05/20/2019     Current Outpatient Medications   Medication Sig Dispense Refill    Trintellix 10 mg tablet       azithromycin (ZITHROMAX) 250 mg tablet Take 2 tablets today, then take 1 tablet daily 6 Tablet 0    benzonatate (TESSALON) 100 mg capsule Take 1 Capsule by mouth three (3) times daily as needed for Cough for up to 7 days. 20 Capsule 0     Allergies   Allergen Reactions    Fluoxetine Other (comments)     Induced love    Diclofenac Swelling     Leg swelling    Levaquin [Levofloxacin] Other (comments)     Pins and needles sensation to arms and legs    Naproxen Swelling     Leg swelling    Peanut Hives     Past Medical History:   Diagnosis Date    Anxiety     Nicotine vapor product user     Panic attacks      History reviewed. No pertinent surgical history.   Family History   Problem Relation Age of Onset    No Known Problems Mother     No Known Problems Father     Suicide Maternal Aunt      Social History     Tobacco Use    Smoking status: Never Smoker    Smokeless tobacco: Current User    Tobacco comment: Vaping    Substance Use Topics    Alcohol use: Yes     Comment: patient reports 1-2 drinks a year       Review of Systems   Constitutional: Negative. HENT: Positive for congestion. Eyes: Negative. Respiratory: Positive for cough and sputum production. Cardiovascular: Negative. Gastrointestinal: Negative. Genitourinary: Negative. Musculoskeletal: Negative. Skin: Negative. Neurological: Negative. Endo/Heme/Allergies: Negative. Psychiatric/Behavioral: Negative.             Objective:     Patient-Reported Vitals 5/18/2022   Patient-Reported Weight 270lb        [INSTRUCTIONS:  \"[x]\" Indicates a positive item  \"[]\" Indicates a negative item  -- DELETE ALL ITEMS NOT EXAMINED]    Constitutional: [x] Appears well-developed and well-nourished [x] No apparent distress      [] Abnormal -     Mental status: [x] Alert and awake  [x] Oriented to person/place/time [x] Able to follow commands    [] Abnormal -     Eyes:   EOM    [x]  Normal    [] Abnormal -   Sclera  [x]  Normal    [] Abnormal -          Discharge [x]  None visible   [] Abnormal -     HENT: [x] Normocephalic, atraumatic  [] Abnormal -   [x] Mouth/Throat: Mucous membranes are moist    External Ears [x] Normal  [] Abnormal -    Neck: [x] No visualized mass [] Abnormal -     Pulmonary/Chest: [x] Respiratory effort normal   [x] No visualized signs of difficulty breathing or respiratory distress        [] Abnormal -      Musculoskeletal:   [x] Normal gait with no signs of ataxia         [x] Normal range of motion of neck        [] Abnormal -     Neurological:        [x] No Facial Asymmetry (Cranial nerve 7 motor function) (limited exam due to video visit)          [x] No gaze palsy        [] Abnormal -          Skin:        [x] No significant exanthematous lesions or discoloration noted on facial skin         [] Abnormal -            Psychiatric:       [x] Normal Affect [] Abnormal - [x] No Hallucinations    Other pertinent observable physical exam findings:-    We discussed the expected course, resolution and complications of the diagnosis(es) in detail. Medication risks, benefits, costs, interactions, and alternatives were discussed as indicated. I advised him to contact the office if his condition worsens, changes or fails to improve as anticipated. He expressed understanding with the diagnosis(es) and plan. Erin Borjas is a 56 y.o. male  is being evaluated by a Virtual Visit (video visit) encounter to address concerns as mentioned above. A caregiver was present when appropriate. Due to this being a TeleHealth encounter (During [place] [address] public health emergency), evaluation of the following organ systems was limited: Vitals/Constitutional/EENT/Resp/CV/GI//MS/Neuro/Skin/Heme-Lymph-Imm. Pursuant to the emergency declaration under the [address] [place] [address], [address] [place] [address] authority and the [employer] Act, this Virtual Visit was conducted with patient's (and/or legal guardian's) consent, to reduce the patient's risk of exposure to COVID-19 and provide necessary medical care. The patient (and/or legal guardian) has also been advised to contact this office for worsening conditions or problems, and seek emergency medical treatment and/or call 911 if deemed necessary. Patient identification was verified at the start of the visit: YES    Services were provided through a video synchronous discussion virtually to substitute for in-person clinic visit. Patient was located at their homes. Provider located in office    An electronic signature was used to authenticate this note.       Bernardo Cagle MD

## 2022-05-18 NOTE — PROGRESS NOTES
1. Have you been to the ER, urgent care clinic since your last visit? Hospitalized since your last visit? No    2. Have you seen or consulted any other health care providers outside of the [address] [place] [address] since your last visit? Include any pap smears or colon screening. No     Chief Complaint   Patient presents with    Cough    Headache    Chills    Fever    Diarrhea     Health Maintenance Due   Topic Date Due    Hepatitis C Screening  Never done    COVID-19 Vaccine (1) Never done    Depression Monitoring  Never done    Colorectal Cancer Screening Combo  Never done     3 most recent PHQ Screens 5/18/2022   Little interest or pleasure in doing things Not at all   Feeling down, depressed, irritable, or hopeless Not at all   Total Score PHQ 2 0     Abuse Screening Questionnaire 5/18/2022   Do you ever feel afraid of your partner? N   Are you in a relationship with someone who physically or mentally threatens you? N   Is it safe for you to go home?  Y     Patient-Reported Vitals 5/18/2022   Patient-Reported Weight 270lb      Learning Assessment 4/3/2019   PRIMARY LEARNER Patient   PRIMARY LANGUAGE ENGLISH   LEARNER PREFERENCE PRIMARY VIDEOS   ANSWERED BY patient   RELATIONSHIP SELF

## 2022-09-13 ENCOUNTER — OFFICE VISIT (OUTPATIENT)
Dept: FAMILY MEDICINE CLINIC | Age: 47
End: 2022-09-13
Payer: COMMERCIAL

## 2022-09-13 VITALS
RESPIRATION RATE: 16 BRPM | SYSTOLIC BLOOD PRESSURE: 123 MMHG | BODY MASS INDEX: 33.25 KG/M2 | WEIGHT: 281.6 LBS | HEIGHT: 77 IN | HEART RATE: 105 BPM | TEMPERATURE: 97 F | DIASTOLIC BLOOD PRESSURE: 79 MMHG | OXYGEN SATURATION: 96 %

## 2022-09-13 DIAGNOSIS — Z51.81 ENCOUNTER FOR THERAPEUTIC DRUG MONITORING: ICD-10-CM

## 2022-09-13 DIAGNOSIS — D22.9 CHANGE IN SKIN MOLE: ICD-10-CM

## 2022-09-13 DIAGNOSIS — E78.2 MIXED HYPERLIPIDEMIA: ICD-10-CM

## 2022-09-13 DIAGNOSIS — Z12.5 SCREENING FOR MALIGNANT NEOPLASM OF PROSTATE: ICD-10-CM

## 2022-09-13 DIAGNOSIS — Z11.59 ENCOUNTER FOR HEPATITIS C SCREENING TEST FOR LOW RISK PATIENT: ICD-10-CM

## 2022-09-13 DIAGNOSIS — G47.26 SHIFT WORK SLEEP DISORDER: Primary | ICD-10-CM

## 2022-09-13 DIAGNOSIS — R73.01 IFG (IMPAIRED FASTING GLUCOSE): ICD-10-CM

## 2022-09-13 DIAGNOSIS — F33.41 RECURRENT MAJOR DEPRESSIVE DISORDER, IN PARTIAL REMISSION (HCC): ICD-10-CM

## 2022-09-13 DIAGNOSIS — R00.0 TACHYCARDIA: ICD-10-CM

## 2022-09-13 LAB
ALBUMIN SERPL-MCNC: 4.4 G/DL (ref 3.5–5)
ALBUMIN/GLOB SERPL: 1.2 {RATIO} (ref 1.1–2.2)
ALP SERPL-CCNC: 75 U/L (ref 45–117)
ALT SERPL-CCNC: 50 U/L (ref 12–78)
ANION GAP SERPL CALC-SCNC: 7 MMOL/L (ref 5–15)
APPEARANCE UR: ABNORMAL
AST SERPL-CCNC: 27 U/L (ref 15–37)
BACTERIA URNS QL MICRO: NEGATIVE /HPF
BASOPHILS # BLD: 0.1 K/UL (ref 0–0.1)
BASOPHILS NFR BLD: 1 % (ref 0–1)
BILIRUB SERPL-MCNC: 0.6 MG/DL (ref 0.2–1)
BILIRUB UR QL: NEGATIVE
BUN SERPL-MCNC: 17 MG/DL (ref 6–20)
BUN/CREAT SERPL: 13 (ref 12–20)
CALCIUM SERPL-MCNC: 9.7 MG/DL (ref 8.5–10.1)
CHLORIDE SERPL-SCNC: 103 MMOL/L (ref 97–108)
CHOLEST SERPL-MCNC: 232 MG/DL
CO2 SERPL-SCNC: 27 MMOL/L (ref 21–32)
COLOR UR: ABNORMAL
COMMENT, HOLDF: NORMAL
CREAT SERPL-MCNC: 1.31 MG/DL (ref 0.7–1.3)
CREAT UR-MCNC: 582 MG/DL
DIFFERENTIAL METHOD BLD: ABNORMAL
EOSINOPHIL # BLD: 0.2 K/UL (ref 0–0.4)
EOSINOPHIL NFR BLD: 3 % (ref 0–7)
EPITH CASTS URNS QL MICRO: ABNORMAL /LPF
ERYTHROCYTE [DISTWIDTH] IN BLOOD BY AUTOMATED COUNT: 12.3 % (ref 11.5–14.5)
EST. AVERAGE GLUCOSE BLD GHB EST-MCNC: 143 MG/DL
GLOBULIN SER CALC-MCNC: 3.6 G/DL (ref 2–4)
GLUCOSE SERPL-MCNC: 177 MG/DL (ref 65–100)
GLUCOSE UR STRIP.AUTO-MCNC: 100 MG/DL
HBA1C MFR BLD: 6.6 % (ref 4–5.6)
HCT VFR BLD AUTO: 51.5 % (ref 36.6–50.3)
HCV AB SERPL QL IA: NONREACTIVE
HDLC SERPL-MCNC: 55 MG/DL
HDLC SERPL: 4.2 {RATIO} (ref 0–5)
HGB BLD-MCNC: 17.9 G/DL (ref 12.1–17)
HGB UR QL STRIP: NEGATIVE
IMM GRANULOCYTES # BLD AUTO: 0 K/UL (ref 0–0.04)
IMM GRANULOCYTES NFR BLD AUTO: 0 % (ref 0–0.5)
KETONES UR QL STRIP.AUTO: ABNORMAL MG/DL
LDLC SERPL CALC-MCNC: 144.2 MG/DL (ref 0–100)
LEUKOCYTE ESTERASE UR QL STRIP.AUTO: NEGATIVE
LYMPHOCYTES # BLD: 3.3 K/UL (ref 0.8–3.5)
LYMPHOCYTES NFR BLD: 44 % (ref 12–49)
MCH RBC QN AUTO: 32.7 PG (ref 26–34)
MCHC RBC AUTO-ENTMCNC: 34.8 G/DL (ref 30–36.5)
MCV RBC AUTO: 94.1 FL (ref 80–99)
MICROALBUMIN UR-MCNC: 23.4 MG/DL
MICROALBUMIN/CREAT UR-RTO: 40 MG/G (ref 0–30)
MONOCYTES # BLD: 0.4 K/UL (ref 0–1)
MONOCYTES NFR BLD: 5 % (ref 5–13)
MUCOUS THREADS URNS QL MICRO: ABNORMAL /LPF
NEUTS SEG # BLD: 3.6 K/UL (ref 1.8–8)
NEUTS SEG NFR BLD: 47 % (ref 32–75)
NITRITE UR QL STRIP.AUTO: NEGATIVE
NRBC # BLD: 0 K/UL (ref 0–0.01)
NRBC BLD-RTO: 0 PER 100 WBC
PH UR STRIP: 5 [PH] (ref 5–8)
PLATELET # BLD AUTO: 214 K/UL (ref 150–400)
PMV BLD AUTO: 10.5 FL (ref 8.9–12.9)
POTASSIUM SERPL-SCNC: 4.2 MMOL/L (ref 3.5–5.1)
PROT SERPL-MCNC: 8 G/DL (ref 6.4–8.2)
PROT UR STRIP-MCNC: 30 MG/DL
PSA SERPL-MCNC: 2 NG/ML (ref 0.01–4)
RBC # BLD AUTO: 5.47 M/UL (ref 4.1–5.7)
RBC #/AREA URNS HPF: ABNORMAL /HPF (ref 0–5)
SAMPLES BEING HELD,HOLD: NORMAL
SODIUM SERPL-SCNC: 137 MMOL/L (ref 136–145)
SP GR UR REFRACTOMETRY: 1.03 (ref 1–1.03)
TRIGL SERPL-MCNC: 164 MG/DL (ref ?–150)
UA: UC IF INDICATED,UAUC: ABNORMAL
UROBILINOGEN UR QL STRIP.AUTO: 0.2 EU/DL (ref 0.2–1)
VLDLC SERPL CALC-MCNC: 32.8 MG/DL
WBC # BLD AUTO: 7.5 K/UL (ref 4.1–11.1)
WBC URNS QL MICRO: ABNORMAL /HPF (ref 0–4)

## 2022-09-13 PROCEDURE — 99214 OFFICE O/P EST MOD 30 MIN: CPT | Performed by: FAMILY MEDICINE

## 2022-09-13 RX ORDER — MODAFINIL 200 MG/1
200 TABLET ORAL DAILY
Qty: 30 TABLET | Refills: 0 | Status: SHIPPED | OUTPATIENT
Start: 2022-09-13 | End: 2022-09-15 | Stop reason: SDUPTHER

## 2022-09-13 NOTE — PROGRESS NOTES
2022   Sabrina Yoder (: 1975) is a 56 y.o. male, established patient, here for evaluation of the following chief complaint(s):  Skin Problem (Mole on left shoulder that's changed in size About 1 month  No pain just itching )     ASSESSMENT/PLAN:  Below is the assessment and plan developed based on review of pertinent history, physical exam, labs, studies, and medications. 1. Shift work sleep disorder  -     modafiniL (PROVIGIL) 200 mg tablet; Take 1 Tablet by mouth daily. Max Daily Amount: 200 mg. 200 mg as a single dose ~1 hour prior to start of work shift., Normal, Disp-30 Tablet, R-0  -     CBC WITH AUTOMATED DIFF; Future  -     METABOLIC PANEL, COMPREHENSIVE; Future  -     URINALYSIS W/ REFLEX CULTURE; Future  -     THYROID CASCADE PROFILE; Future  -     10-DRUG SCREEN W/CONF; Future  2. Change in skin mole  -     REFERRAL TO DERMATOLOGY  3. Mixed hyperlipidemia  -     LIPID PANEL; Future  4. Recurrent major depressive disorder, in partial remission ([facility] [address]  -     CBC WITH AUTOMATED DIFF; Future  -     METABOLIC PANEL, COMPREHENSIVE; Future  -     THYROID CASCADE PROFILE; Future  5. IFG (impaired fasting glucose)  -     CBC WITH AUTOMATED DIFF; Future  -     METABOLIC PANEL, COMPREHENSIVE; Future  -     URINALYSIS W/ REFLEX CULTURE; Future  -     HEMOGLOBIN A1C WITH EAG; Future  -     MICROALBUMIN, UR, RAND W/ MICROALB/CREAT RATIO; Future  6. Tachycardia  -     CBC WITH AUTOMATED DIFF; Future  -     METABOLIC PANEL, COMPREHENSIVE; Future  -     URINALYSIS W/ REFLEX CULTURE; Future  -     THYROID CASCADE PROFILE; Future  7. Screening for malignant neoplasm of prostate  -     PSA SCREENING (SCREENING); Future  8. Encounter for hepatitis C screening test for low risk patient  -     HEPATITIS C AB; Future  9. Encounter for therapeutic drug monitoring  -     10-DRUG SCREEN W/CONF; Future    Return in about 1 month (around 10/13/2022) for follow up, med check. SUBJECTIVE/OBJECTIVE:  HPI   1.  Shift work sleep disorder  Patient reports he is currently working with a . His work involves him to be up at late hours of the night. Sometimes he only gets 3 to 4 hours of sleep. He usually sleeps fine early hours of the morning. Patient is unable to concentrate on work as he is not sleeping during the daytime and feels tired and drowsy. He is requesting prescription for Provigil to help him stay awake during work and go to sleep after work. 2. Change in skin mole  Patient reports change in size of a mole on her left shoulder. Patient recently noticed it growing in size. There is no hyperpigmentation. Sometimes itches. I will refer to dermatologist for biopsy. 3. Mixed hyperlipidemia  Check cholesterol    4. Recurrent major depressive disorder, in partial remission [facility])  Being managed by psychiatrist.  G[facility]ell [place]. Denies suicidal or homicidal ideation. 5. IFG (impaired fasting glucose)  Check labs    6. Tachycardia  Denies palpitations. Check labs. 7. Screening for malignant neoplasm of prostate  Check PSA    8. Encounter for hepatitis C screening test for low risk patient  Check hep C  9. Encounter for therapeutic drug monitoring   reviewed. I will check drug screen prior to starting Provigil. No results found for any visits on 09/13/22. Review of Systems   Constitutional: Negative. HENT: Negative. Eyes: Negative. Respiratory: Negative. Cardiovascular: Negative. Gastrointestinal: Negative. Genitourinary: Negative. Musculoskeletal: Negative. Skin: Negative. Neurological: Negative. Endo/Heme/Allergies: Negative. Psychiatric/Behavioral:  Positive for depression. Negative for suicidal ideas. The patient has insomnia. Physical Exam  Vitals and nursing note reviewed. HENT:      Head: Normocephalic and atraumatic.       Right Ear: External ear normal.      Left Ear: External ear normal.      Nose: Nose normal.   Eyes: Conjunctiva/sclera: Conjunctivae normal.   Cardiovascular:      Rate and Rhythm: Regular rhythm. Tachycardia present. Pulmonary:      Effort: Pulmonary effort is normal.      Breath sounds: Normal breath sounds. Abdominal:      General: Bowel sounds are normal. There is no distension. Palpations: Abdomen is soft. Tenderness: There is no abdominal tenderness. Musculoskeletal:         General: Normal range of motion. Cervical back: Normal range of motion and neck supple. Skin:     General: Skin is warm and dry. Neurological:      General: No focal deficit present. Mental Status: He is alert. /79 (BP 1 Location: Left upper arm, BP Patient Position: Sitting, BP Cuff Size: Large adult)   Pulse (!) 105   Temp 97 °F (36.1 °C) (Temporal)   Resp 16   Ht 6' 5\" (1.956 m)   Wt 281 lb 9.6 oz (127.7 kg)   SpO2 96%   BMI 33.39 kg/m²     Allergies   Allergen Reactions    Fluoxetine Other (comments)     Induced love    Diclofenac Swelling     Leg swelling    Levaquin [Levofloxacin] Other (comments)     Pins and needles sensation to arms and legs    Naproxen Swelling     Leg swelling    Peanut Hives       Current Outpatient Medications   Medication Sig    modafiniL (PROVIGIL) 200 mg tablet Take 1 Tablet by mouth daily. Max Daily Amount: 200 mg. 200 mg as a single dose ~1 hour prior to start of work shift. Trintellix 10 mg tablet      No current facility-administered medications for this visit. Past Medical History:   Diagnosis Date    Anxiety     Nicotine vapor product user     Panic attacks        No past surgical history on file. Social History:  reports that he has never smoked. He uses smokeless tobacco. He reports current alcohol use. He reports current drug use. Drug: Marijuana.     Patient Care Team:  Anastasiya Dorman MD as PCP - General (Family Medicine)  Anastasiya Dorman MD as PCP [facility]    Problem List  Date Reviewed: 5/18/2022 Codes Class Noted    Tachycardia ICD-10-CM: R00.0  ICD-9-CM: 785.0  9/13/2022        Depression ICD-10-CM: F32. A  ICD-9-CM: 054  5/20/2019                I ADVISED PATIENT TO GO TO ER IF SYMPTOMS WORSEN , CHANGE OR FAILS TO IMPROVE. I have discussed the diagnosis with the patient and the intended plan as seen in the above orders. The patient has received an after-visit summary and questions were answered concerning future plans. I have discussed medication side effects and warnings with the patient as well. The patient agrees and understands above plan. An electronic signature was used to authenticate this note.   -- Rey Cifuentes MD

## 2022-09-13 NOTE — PROGRESS NOTES
1. Have you been to the ER, urgent care clinic since your last visit? Hospitalized since your last visit? No    2. Have you seen or consulted any other health care providers outside of the [address] [place] [address] since your last visit? Include any pap smears or colon screening.  No      Chief Complaint   Patient presents with    Skin Problem     Mole on left shoulder that's changed in size About 1 month  No pain just itching

## 2022-09-14 LAB
AMPHETAMINES UR QL SCN: NEGATIVE NG/ML
BARBITURATES UR QL SCN: NEGATIVE NG/ML
BENZODIAZ UR QL SCN: NEGATIVE NG/ML
BZE UR QL SCN: NEGATIVE NG/ML
CANNABINOIDS UR QL SCN: NEGATIVE NG/ML
CREAT UR-MCNC: 482.5 MG/DL (ref 20–300)
METHADONE UR QL SCN: NEGATIVE NG/ML
OPIATES UR QL SCN: NEGATIVE NG/ML
OXYCODONE+OXYMORPHONE UR QL SCN: NEGATIVE NG/ML
PCP UR QL: NEGATIVE NG/ML
PH UR: 5.2 [PH] (ref 4.5–8.9)
PLEASE NOTE:, 733157: ABNORMAL
PROPOXYPH UR QL SCN: NEGATIVE NG/ML
TSH SERPL-ACNC: 2.58 UIU/ML (ref 0.45–4.5)

## 2022-09-14 NOTE — PROGRESS NOTES
Please inform patient, HbA1c is in diabetes range. Cholesterol elevated. Please schedule patient to discuss abnormal labs.   Thanks

## 2022-09-15 ENCOUNTER — VIRTUAL VISIT (OUTPATIENT)
Dept: FAMILY MEDICINE CLINIC | Age: 47
End: 2022-09-15
Payer: COMMERCIAL

## 2022-09-15 DIAGNOSIS — Z71.2 ENCOUNTER TO DISCUSS TEST RESULTS: ICD-10-CM

## 2022-09-15 DIAGNOSIS — E11.9 TYPE 2 DIABETES MELLITUS WITHOUT COMPLICATION, WITHOUT LONG-TERM CURRENT USE OF INSULIN (HCC): ICD-10-CM

## 2022-09-15 DIAGNOSIS — R80.9 MICROALBUMINURIA: Primary | ICD-10-CM

## 2022-09-15 DIAGNOSIS — G47.26 SHIFT WORK SLEEP DISORDER: ICD-10-CM

## 2022-09-15 DIAGNOSIS — E78.2 MIXED HYPERLIPIDEMIA: ICD-10-CM

## 2022-09-15 PROCEDURE — 3044F HG A1C LEVEL LT 7.0%: CPT | Performed by: FAMILY MEDICINE

## 2022-09-15 PROCEDURE — 99214 OFFICE O/P EST MOD 30 MIN: CPT | Performed by: FAMILY MEDICINE

## 2022-09-15 RX ORDER — LANCETS
EACH MISCELLANEOUS
Qty: 100 EACH | Refills: 11 | Status: SHIPPED | OUTPATIENT
Start: 2022-09-15

## 2022-09-15 RX ORDER — MODAFINIL 200 MG/1
200 TABLET ORAL DAILY
Qty: 30 TABLET | Refills: 2 | Status: SHIPPED | OUTPATIENT
Start: 2022-10-13

## 2022-09-15 RX ORDER — IBUPROFEN 200 MG
CAPSULE ORAL
Qty: 100 STRIP | Refills: 11 | Status: SHIPPED | OUTPATIENT
Start: 2022-09-15

## 2022-09-15 RX ORDER — INSULIN PUMP SYRINGE, 3 ML
EACH MISCELLANEOUS
Qty: 1 KIT | Refills: 0 | Status: SHIPPED | OUTPATIENT
Start: 2022-09-15

## 2022-09-15 NOTE — PROGRESS NOTES
Consent: Nikhil Andrews, who was seen by synchronous (real-time) audio-video technology, and/or his healthcare decision maker, is aware that this patient-initiated, Telehealth encounter on 9/15/2022 is a billable service, with coverage as determined by his insurance carrier. He is aware that he may receive a bill and has provided verbal consent to proceed: Yes. Assessment & Plan:   Diagnoses and all orders for this visit:    1. Microalbuminuria  -     METABOLIC PANEL, COMPREHENSIVE; Future  -     MICROALBUMIN, UR, RAND W/ MICROALB/CREAT RATIO; Future    2. Type 2 diabetes mellitus without complication, without long-term current use of insulin (HCC)  -     REFERRAL TO DIABETIC EDUCATION  -     Blood-Glucose Meter monitoring kit; Check blood glucose daily  -     glucose blood VI test strips (blood glucose test) strip; Check blood glucose fasting daily  -     lancets misc; Check blood glucose daily  -     METABOLIC PANEL, COMPREHENSIVE; Future  -     HEMOGLOBIN A1C WITH EAG; Future  -     MICROALBUMIN, UR, RAND W/ MICROALB/CREAT RATIO; Future    3. Mixed hyperlipidemia  -     METABOLIC PANEL, COMPREHENSIVE; Future  -     LIPID PANEL; Future    4. Encounter to discuss test results    5. Shift work sleep disorder  -     modafiniL (PROVIGIL) 200 mg tablet; Take 1 Tablet by mouth daily. Max Daily Amount: 200 mg. 200 mg as a single dose ~1 hour prior to start of work shift. Follow-up and Dispositions    Return in about 3 months (around 12/15/2022) for follow up, labs. I ADVISED PATIENT TO GO TO ER IF SYMPTOMS WORSEN , CHANGE OR FAILS TO IMPROVE. I spent at least 15 minutes with this established patient, and >50% of the time was spent counseling and/or coordinating care regarding SEE BELOW  [id number]  Subjective:   Nikhil Andrews is a 56 y.o. 1975 male  established patient, who was seen for Results          1. Microalbuminuria  Advised to increase fluid intake. Check labs in 3 months.     2. Type 2 diabetes mellitus without complication, without long-term current use of insulin ([facility] [address]  New diagnosis. Not currently on diabetes medication. Advised to start blood glucose log. Start diabetes education. Start lifestyle modification. Recheck labs in 3 months. Diabetic Foot and Eye Exam HM Status   Topic Date Due    Diabetic Foot Care  Never done    Eye Exam  Never done     There is no immunization history for the selected administration types on file for this patient. Lab Results   Component Value Date/Time    Microalbumin/Creat ratio (mg/g creat) 40 (H) 09/13/2022 08:58 AM    Microalbumin,urine random 23.40 09/13/2022 08:58 AM     Lab Results   Component Value Date/Time    Hemoglobin A1c 6.6 (H) 09/13/2022 08:58 AM            3. Mixed hyperlipidemia    Patient presents today for hyperlipidemia. Patient currently not taking any cholesterol medication. Patient chooses to start lifestyle modification including changes in diet and exercise. Recheck labs in 3 months. Lab Results   Component Value Date/Time    Cholesterol, total 232 (H) 09/13/2022 08:58 AM    HDL Cholesterol 55 09/13/2022 08:58 AM    LDL, calculated 144.2 (H) 09/13/2022 08:58 AM    VLDL, calculated 32.8 09/13/2022 08:58 AM    Triglyceride 164 (H) 09/13/2022 08:58 AM    CHOL/HDL Ratio 4.2 09/13/2022 08:58 AM        4. Encounter to discuss test results  All lab results discussed in detail with the patient today. 5. Shift work sleep disorder  Drug screen reviewed.  reviewed. Patient reports no side effects from current dose of Provigil. 3-month supply prescriptions sent to pharmacy on chart. Patient currently has fill prescription for September. New prescriptions will start from October. Follow-up in 3 months. Prior to Admission medications    Medication Sig Start Date End Date Taking?  Authorizing Provider   Blood-Glucose Meter monitoring kit Check blood glucose daily 9/15/22  Yes Gabrielle Moreno MD   glucose blood VI test strips (blood glucose test) strip Check blood glucose fasting daily 9/15/22  Yes Keith Landa MD   lancets misc Check blood glucose daily 9/15/22  Yes Keith Landa MD   modafiniL (PROVIGIL) 200 mg tablet Take 1 Tablet by mouth daily. Max Daily Amount: 200 mg. 200 mg as a single dose ~1 hour prior to start of work shift. 10/13/22  Yes Keith Landa MD   Trintellix 10 mg tablet  4/15/22  Yes Provider, Historical   modafiniL (PROVIGIL) 200 mg tablet Take 1 Tablet by mouth daily. Max Daily Amount: 200 mg. 200 mg as a single dose ~1 hour prior to start of work shift. 9/13/22 9/15/22  Keith Landa MD     Allergies   Allergen Reactions    Fluoxetine Other (comments)     Induced love    Diclofenac Swelling     Leg swelling    Levaquin [Levofloxacin] Other (comments)     Pins and needles sensation to arms and legs    Naproxen Swelling     Leg swelling    Peanut Hives       Patient Active Problem List   Diagnosis Code    Depression F32. A    Tachycardia R00.0    Microalbuminuria R80.9    Type 2 diabetes mellitus without complication, without long-term current use of insulin ([facility]) E11.9    Mixed hyperlipidemia E78.2     Patient Active Problem List    Diagnosis Date Noted    Microalbuminuria 09/15/2022    Type 2 diabetes mellitus without complication, without long-term current use of insulin [facility] 09/15/2022    Mixed hyperlipidemia 09/15/2022    Tachycardia 09/13/2022    Depression 05/20/2019     Current Outpatient Medications   Medication Sig Dispense Refill    Blood-Glucose Meter monitoring kit Check blood glucose daily 1 Kit 0    glucose blood VI test strips (blood glucose test) strip Check blood glucose fasting daily 100 Strip 11    lancets misc Check blood glucose daily 100 Each 11    [START ON 10/13/2022] modafiniL (PROVIGIL) 200 mg tablet Take 1 Tablet by mouth daily. Max Daily Amount: 200 mg. 200 mg as a single dose ~1 hour prior to start of work shift.  30 Tablet 2    Trintellix 10 mg tablet        Allergies Allergen Reactions    Fluoxetine Other (comments)     Induced love    Diclofenac Swelling     Leg swelling    Levaquin [Levofloxacin] Other (comments)     Pins and needles sensation to arms and legs    Naproxen Swelling     Leg swelling    Peanut Hives     Past Medical History:   Diagnosis Date    Anxiety     Nicotine vapor product user     Panic attacks      No past surgical history on file. Family History   Problem Relation Age of Onset    No Known Problems Mother     No Known Problems Father     Suicide Maternal Aunt      Social History     Tobacco Use    Smoking status: Never    Smokeless tobacco: Current    Tobacco comments:     Vaping    Substance Use Topics    Alcohol use: Yes     Comment: patient reports 1-2 drinks a year       Review of Systems   Constitutional: Negative. HENT: Negative. Eyes: Negative. Respiratory: Negative. Cardiovascular: Negative. Gastrointestinal: Negative. Genitourinary: Negative. Musculoskeletal: Negative. Skin: Negative. Neurological: Negative. Endo/Heme/Allergies: Negative. Psychiatric/Behavioral: Negative.            Objective:     Patient-Reported Vitals 9/15/2022   Patient-Reported Weight 281lbs        [INSTRUCTIONS:  \"[x]\" Indicates a positive item  \"[]\" Indicates a negative item  -- DELETE ALL ITEMS NOT EXAMINED]    Constitutional: [x] Appears well-developed and well-nourished [x] No apparent distress      [] Abnormal -     Mental status: [x] Alert and awake  [x] Oriented to person/place/time [x] Able to follow commands    [] Abnormal -     Eyes:   EOM    [x]  Normal    [] Abnormal -   Sclera  [x]  Normal    [] Abnormal -          Discharge [x]  None visible   [] Abnormal -     HENT: [x] Normocephalic, atraumatic  [] Abnormal -   [x] Mouth/Throat: Mucous membranes are moist    External Ears [x] Normal  [] Abnormal -    Neck: [x] No visualized mass [] Abnormal -     Pulmonary/Chest: [x] Respiratory effort normal   [x] No visualized signs of difficulty breathing or respiratory distress        [] Abnormal -      Musculoskeletal:   [x] Normal gait with no signs of ataxia         [x] Normal range of motion of neck        [] Abnormal -     Neurological:        [x] No Facial Asymmetry (Cranial nerve 7 motor function) (limited exam due to video visit)          [x] No gaze palsy        [] Abnormal -          Skin:        [x] No significant exanthematous lesions or discoloration noted on facial skin         [] Abnormal -            Psychiatric:       [x] Normal Affect [] Abnormal -        [x] No Hallucinations    Other pertinent observable physical exam findings:-    We discussed the expected course, resolution and complications of the diagnosis(es) in detail. Medication risks, benefits, costs, interactions, and alternatives were discussed as indicated. I advised him to contact the office if his condition worsens, changes or fails to improve as anticipated. He expressed understanding with the diagnosis(es) and plan. Ekta Peñaloza is a 56 y.o. male  is being evaluated by a Virtual Visit (video visit) encounter to address concerns as mentioned above. A caregiver was present when appropriate. Due to this being a TeleHealth encounter (During [facility]- public health emergency), evaluation of the following organ systems was limited: Vitals/Constitutional/EENT/Resp/CV/GI//MS/Neuro/Skin/Heme-Lymph-Imm. Pursuant to the emergency declaration under the [address] [place] [address] authority and the [employer] Act, this Virtual Visit was conducted with patient's (and/or legal guardian's) consent, to reduce the patient's risk of exposure to COVID-19 and provide necessary medical care.   The patient (and/or legal guardian) has also been advised to contact this office for worsening conditions or problems, and seek emergency medical treatment and/or call 911 if deemed necessary. Patient identification was verified at the start of the visit: YES    Services were provided through a video synchronous discussion virtually to substitute for in-person clinic visit. Patient was located at their homes. Provider located in office    An electronic signature was used to authenticate this note.       Dennis Acosta MD

## 2022-09-15 NOTE — PROGRESS NOTES
Chief Complaint   Patient presents with    Results     1. Have you been to the ER, urgent care clinic since your last visit? Hospitalized since your last visit? No    2. Have you seen or consulted any other health care providers outside of the [address] [place] [address] since your last visit? Include any pap smears or colon screening.  No

## 2023-01-17 ENCOUNTER — TELEPHONE (OUTPATIENT)
Dept: FAMILY MEDICINE CLINIC | Age: 48
End: 2023-01-17

## 2023-01-23 ENCOUNTER — VIRTUAL VISIT (OUTPATIENT)
Dept: FAMILY MEDICINE CLINIC | Age: 48
End: 2023-01-23
Payer: COMMERCIAL

## 2023-01-23 DIAGNOSIS — G47.26 SHIFT WORK SLEEP DISORDER: ICD-10-CM

## 2023-01-23 DIAGNOSIS — E78.1 HYPERTRIGLYCERIDEMIA: ICD-10-CM

## 2023-01-23 DIAGNOSIS — E78.2 MIXED HYPERLIPIDEMIA: Primary | ICD-10-CM

## 2023-01-23 DIAGNOSIS — R73.01 IFG (IMPAIRED FASTING GLUCOSE): ICD-10-CM

## 2023-01-23 DIAGNOSIS — Z71.2 ENCOUNTER TO DISCUSS TEST RESULTS: ICD-10-CM

## 2023-01-23 DIAGNOSIS — R80.9 MICROALBUMINURIA: ICD-10-CM

## 2023-01-23 PROCEDURE — 99214 OFFICE O/P EST MOD 30 MIN: CPT | Performed by: FAMILY MEDICINE

## 2023-01-23 RX ORDER — MODAFINIL 200 MG/1
200 TABLET ORAL DAILY
Qty: 30 TABLET | Refills: 2 | Status: SHIPPED | OUTPATIENT
Start: 2023-01-23

## 2023-01-23 RX ORDER — GLUCOSAM/CHONDRO/HERB 149/HYAL 750-100 MG
1 TABLET ORAL
Qty: 60 CAPSULE | Refills: 5 | Status: SHIPPED | OUTPATIENT
Start: 2023-01-23

## 2023-01-23 NOTE — PROGRESS NOTES
Consent: Jonathan Garrett, who was seen by synchronous (real-time) audio-video technology, and/or his healthcare decision maker, is aware that this patient-initiated, Telehealth encounter on 1/23/2023 is a billable service, with coverage as determined by his insurance carrier. He is aware that he may receive a bill and has provided verbal consent to proceed: Yes. Assessment & Plan:   Diagnoses and all orders for this visit:    1. Mixed hyperlipidemia  -     omega 3-DHA-EPA-fish oil (Fish OiL) 1,000 mg (120 mg-180 mg) capsule; Take 1 Capsule by mouth two (2) times daily (after meals). -     METABOLIC PANEL, COMPREHENSIVE; Future  -     LIPID PANEL; Future    2. Shift work sleep disorder  -     modafiniL (PROVIGIL) 200 mg tablet; Take 1 Tablet by mouth daily. Max Daily Amount: 200 mg. 200 mg as a single dose ~1 hour prior to start of work shift.  -     METABOLIC PANEL, COMPREHENSIVE; Future    3. Hypertriglyceridemia  -     omega 3-DHA-EPA-fish oil (Fish OiL) 1,000 mg (120 mg-180 mg) capsule; Take 1 Capsule by mouth two (2) times daily (after meals). -     MICROALBUMIN, UR, RAND W/ MICROALB/CREAT RATIO; Future  -     LIPID PANEL; Future    4. Encounter to discuss test results    5. Microalbuminuria  -     MICROALBUMIN, UR, RAND W/ MICROALB/CREAT RATIO; Future    6. IFG (impaired fasting glucose)  -     METABOLIC PANEL, COMPREHENSIVE; Future  -     HEMOGLOBIN A1C WITH EAG; Future          Follow-up and Dispositions    Return in about 3 months (around 4/23/2023) for follow up, discuss labs, med check. I ADVISED PATIENT TO GO TO ER IF SYMPTOMS WORSEN , CHANGE OR FAILS TO IMPROVE. I spent at least 15 minutes with this established patient, and >50% of the time was spent counseling and/or coordinating care regarding SEE BELOW  [id number]  Subjective:   Jonathan Garrett is a 56 y.o. 1975 male  established patient, who was seen for Labs (Results ) and Follow-up          1.  Mixed hyperlipidemia  Declined starting statin at this time due to family history of on from statin medications  Patient presents today for hyperlipidemia. Patient currently not on any cholesterol medication. Trying to follow a low cholesterol diet. Exercising mild      Lab Results   Component Value Date/Time    Cholesterol, total 211 (H) 01/10/2023 09:33 AM    HDL Cholesterol 48 01/10/2023 09:33 AM    LDL, calculated 130.8 (H) 01/10/2023 09:33 AM    VLDL, calculated 32.2 01/10/2023 09:33 AM    Triglyceride 161 (H) 01/10/2023 09:33 AM    CHOL/HDL Ratio 4.4 01/10/2023 09:33 AM        2. Shift work sleep disorder  Patient is requesting refills of modafinil. Reports no side effects from current dose.  reviewed. Up-to-date on compliance drug screen. Patient takes modafinil as needed for shiftwork sleep disorder. Reports improvement of symptoms with use of medication. 3. Hypertriglyceridemia  Advised to start fish oil. Recheck labs in 3 months. 4. Encounter to discuss test results  All lab results discussed in detail with the patient today    5. Microalbuminuria  Microalbuminuria previously noted has improved at recent lab results. Continue to increase fluid intake. I will continue to monitor. Recheck in 3 months. 6. IFG (impaired fasting glucose)  HbA1c improved continue low carbohydrate diet. Continue to monitor. Recheck labs in 3 months. Currently not on any diabetes medications. Diabetic Foot and Eye Exam HM Status   Topic Date Due    Diabetic Foot Care  Never done    Eye Exam  Never done     There is no immunization history for the selected administration types on file for this patient.   Lab Results   Component Value Date/Time    Microalbumin/Creat ratio (mg/g creat) 29 01/10/2023 09:33 AM    Microalbumin,urine random 11.80 01/10/2023 09:33 AM     Lab Results   Component Value Date/Time    Hemoglobin A1c 5.9 (H) 01/10/2023 09:33 AM               Prior to Admission medications    Medication Sig Start Date End Date Taking? Authorizing Provider   omega 3-DHA-EPA-fish oil (Fish OiL) 1,000 mg (120 mg-180 mg) capsule Take 1 Capsule by mouth two (2) times daily (after meals). 1/23/23  Yes Cristal Hemphill MD   modafiniL (PROVIGIL) 200 mg tablet Take 1 Tablet by mouth daily. Max Daily Amount: 200 mg. 200 mg as a single dose ~1 hour prior to start of work shift. 1/23/23  Yes Cristal Hemphill MD   Blood-Glucose Meter monitoring kit Check blood glucose daily 9/15/22  Yes Cristal Hemphill MD   glucose blood VI test strips (blood glucose test) strip Check blood glucose fasting daily 9/15/22  Yes Cristal Hemphill MD   lancets misc Check blood glucose daily 9/15/22  Yes Cristal Hemphill MD   Trintellix 10 mg tablet  4/15/22  Yes Provider, Historical   modafiniL (PROVIGIL) 200 mg tablet Take 1 Tablet by mouth daily. Max Daily Amount: 200 mg. 200 mg as a single dose ~1 hour prior to start of work shift. 10/13/22 1/23/23  Cristal Hemphill MD     Allergies   Allergen Reactions    Fluoxetine Other (comments)     Induced love    Diclofenac Swelling     Leg swelling    Levaquin [Levofloxacin] Other (comments)     Pins and needles sensation to arms and legs    Naproxen Swelling     Leg swelling    Peanut Hives       Patient Active Problem List   Diagnosis Code    Depression F32. A    Tachycardia R00.0    Microalbuminuria R80.9    Type 2 diabetes mellitus without complication, without long-term current use of insulin ([facility]) E11.9    Mixed hyperlipidemia E78.2     Patient Active Problem List    Diagnosis Date Noted    Microalbuminuria 09/15/2022    Type 2 diabetes mellitus without complication, without long-term current use of insulin ([facility] [address] 09/15/2022    Mixed hyperlipidemia 09/15/2022    Tachycardia 09/13/2022    Depression 05/20/2019     Current Outpatient Medications   Medication Sig Dispense Refill    omega 3-DHA-EPA-fish oil (Fish OiL) 1,000 mg (120 mg-180 mg) capsule Take 1 Capsule by mouth two (2) times daily (after meals).  60 Capsule 5    modafiniL (PROVIGIL) 200 mg tablet Take 1 Tablet by mouth daily. Max Daily Amount: 200 mg. 200 mg as a single dose ~1 hour prior to start of work shift. 30 Tablet 2    Blood-Glucose Meter monitoring kit Check blood glucose daily 1 Kit 0    glucose blood VI test strips (blood glucose test) strip Check blood glucose fasting daily 100 Strip 11    lancets misc Check blood glucose daily 100 Each 11    Trintellix 10 mg tablet        Allergies   Allergen Reactions    Fluoxetine Other (comments)     Induced love    Diclofenac Swelling     Leg swelling    Levaquin [Levofloxacin] Other (comments)     Pins and needles sensation to arms and legs    Naproxen Swelling     Leg swelling    Peanut Hives     Past Medical History:   Diagnosis Date    Anxiety     Nicotine vapor product user     Panic attacks      No past surgical history on file. Family History   Problem Relation Age of Onset    No Known Problems Mother     No Known Problems Father     Suicide Maternal Aunt      Social History     Tobacco Use    Smoking status: Never    Smokeless tobacco: Current    Tobacco comments:     Vaping    Substance Use Topics    Alcohol use: Yes     Comment: patient reports 1-2 drinks a year       Review of Systems   Constitutional: Negative. HENT: Negative. Eyes: Negative. Respiratory: Negative. Cardiovascular: Negative. Gastrointestinal: Negative. Genitourinary: Negative. Musculoskeletal: Negative. Skin: Negative. Neurological: Negative. Endo/Heme/Allergies: Negative. Psychiatric/Behavioral: Negative.            Objective:     Patient-Reported Vitals 1/23/2023   Patient-Reported Weight 260lb        [INSTRUCTIONS:  \"[x]\" Indicates a positive item  \"[]\" Indicates a negative item  -- DELETE ALL ITEMS NOT EXAMINED]    Constitutional: [x] Appears well-developed and well-nourished [x] No apparent distress      [] Abnormal -     Mental status: [x] Alert and awake  [x] Oriented to person/place/time [x] Able to follow commands    [] Abnormal -     Eyes:   EOM    [x]  Normal    [] Abnormal -   Sclera  [x]  Normal    [] Abnormal -          Discharge [x]  None visible   [] Abnormal -     HENT: [x] Normocephalic, atraumatic  [] Abnormal -   [x] Mouth/Throat: Mucous membranes are moist    External Ears [x] Normal  [] Abnormal -    Neck: [x] No visualized mass [] Abnormal -     Pulmonary/Chest: [x] Respiratory effort normal   [x] No visualized signs of difficulty breathing or respiratory distress        [] Abnormal -      Musculoskeletal:   [x] Normal gait with no signs of ataxia         [x] Normal range of motion of neck        [] Abnormal -     Neurological:        [x] No Facial Asymmetry (Cranial nerve 7 motor function) (limited exam due to video visit)          [x] No gaze palsy        [] Abnormal -          Skin:        [x] No significant exanthematous lesions or discoloration noted on facial skin         [] Abnormal -            Psychiatric:       [x] Normal Affect [] Abnormal -        [x] No Hallucinations    Other pertinent observable physical exam findings:-    We discussed the expected course, resolution and complications of the diagnosis(es) in detail. Medication risks, benefits, costs, interactions, and alternatives were discussed as indicated. I advised him to contact the office if his condition worsens, changes or fails to improve as anticipated. He expressed understanding with the diagnosis(es) and plan. Sera Reyna is a 56 y.o. male  is being evaluated by a Virtual Visit (video visit) encounter to address concerns as mentioned above. A caregiver was present when appropriate. Due to this being a TeleHealth encounter (During [place] [address] public health emergency), evaluation of the following organ systems was limited: Vitals/Constitutional/EENT/Resp/CV/GI//MS/Neuro/Skin/Heme-Lymph-Imm.   Pursuant to the emergency declaration under the [facility] waiver authority and the Coronavirus Preparedness and Response Supplemental Appropriations Act, this Virtual Visit was conducted with patient's (and/or legal guardian's) consent, to reduce the patient's risk of exposure to COVID-19 and provide necessary medical care. The patient (and/or legal guardian) has also been advised to contact this office for worsening conditions or problems, and seek emergency medical treatment and/or call 911 if deemed necessary. Patient identification was verified at the start of the visit: YES    Services were provided through a video synchronous discussion virtually to substitute for in-person clinic visit. Patient was located at their homes. Provider located in office    An electronic signature was used to authenticate this note.       Sae Boateng MD

## 2023-01-23 NOTE — PROGRESS NOTES
1. Have you been to the ER, urgent care clinic since your last visit? Hospitalized since your last visit? No    2. Have you seen or consulted any other health care providers outside of the [address] [place] [address] since your last visit? Include any pap smears or colon screening.  No      Chief Complaint   Patient presents with    Labs     Results     Follow-up

## 2023-05-17 ENCOUNTER — TELEMEDICINE (OUTPATIENT)
Facility: CLINIC | Age: 48
End: 2023-05-17
Payer: COMMERCIAL

## 2023-05-17 DIAGNOSIS — G47.26 CIRCADIAN RHYTHM SLEEP DISORDER, SHIFT WORK TYPE: Primary | ICD-10-CM

## 2023-05-17 PROCEDURE — 99213 OFFICE O/P EST LOW 20 MIN: CPT | Performed by: FAMILY MEDICINE

## 2023-05-17 RX ORDER — OMEGA-3-ACID ETHYL ESTERS 1 G/1
CAPSULE, LIQUID FILLED ORAL
COMMUNITY
Start: 2023-04-26

## 2023-05-17 RX ORDER — MODAFINIL 200 MG/1
TABLET ORAL
Qty: 30 TABLET | Refills: 2 | Status: SHIPPED | OUTPATIENT
Start: 2023-05-17 | End: 2023-08-17

## 2023-05-17 RX ORDER — FLUTICASONE PROPIONATE 50 MCG
SPRAY, SUSPENSION (ML) NASAL
COMMUNITY
Start: 2017-03-20

## 2023-05-17 SDOH — ECONOMIC STABILITY: FOOD INSECURITY: WITHIN THE PAST 12 MONTHS, THE FOOD YOU BOUGHT JUST DIDN'T LAST AND YOU DIDN'T HAVE MONEY TO GET MORE.: NEVER TRUE

## 2023-05-17 SDOH — ECONOMIC STABILITY: FOOD INSECURITY: WITHIN THE PAST 12 MONTHS, YOU WORRIED THAT YOUR FOOD WOULD RUN OUT BEFORE YOU GOT MONEY TO BUY MORE.: NEVER TRUE

## 2023-05-17 SDOH — ECONOMIC STABILITY: HOUSING INSECURITY
IN THE LAST 12 MONTHS, WAS THERE A TIME WHEN YOU DID NOT HAVE A STEADY PLACE TO SLEEP OR SLEPT IN A SHELTER (INCLUDING NOW)?: NO

## 2023-05-17 SDOH — ECONOMIC STABILITY: INCOME INSECURITY: HOW HARD IS IT FOR YOU TO PAY FOR THE VERY BASICS LIKE FOOD, HOUSING, MEDICAL CARE, AND HEATING?: NOT HARD AT ALL

## 2023-05-17 ASSESSMENT — PATIENT HEALTH QUESTIONNAIRE - PHQ9
7. TROUBLE CONCENTRATING ON THINGS, SUCH AS READING THE NEWSPAPER OR WATCHING TELEVISION: 0
6. FEELING BAD ABOUT YOURSELF - OR THAT YOU ARE A FAILURE OR HAVE LET YOURSELF OR YOUR FAMILY DOWN: 0
SUM OF ALL RESPONSES TO PHQ QUESTIONS 1-9: 0
3. TROUBLE FALLING OR STAYING ASLEEP: 0
SUM OF ALL RESPONSES TO PHQ QUESTIONS 1-9: 0
SUM OF ALL RESPONSES TO PHQ QUESTIONS 1-9: 0
10. IF YOU CHECKED OFF ANY PROBLEMS, HOW DIFFICULT HAVE THESE PROBLEMS MADE IT FOR YOU TO DO YOUR WORK, TAKE CARE OF THINGS AT HOME, OR GET ALONG WITH OTHER PEOPLE: 0
SUM OF ALL RESPONSES TO PHQ QUESTIONS 1-9: 0
2. FEELING DOWN, DEPRESSED OR HOPELESS: 0
8. MOVING OR SPEAKING SO SLOWLY THAT OTHER PEOPLE COULD HAVE NOTICED. OR THE OPPOSITE, BEING SO FIGETY OR RESTLESS THAT YOU HAVE BEEN MOVING AROUND A LOT MORE THAN USUAL: 0
5. POOR APPETITE OR OVEREATING: 0
9. THOUGHTS THAT YOU WOULD BE BETTER OFF DEAD, OR OF HURTING YOURSELF: 0
4. FEELING TIRED OR HAVING LITTLE ENERGY: 0

## 2023-05-17 ASSESSMENT — ENCOUNTER SYMPTOMS
GASTROINTESTINAL NEGATIVE: 1
EYES NEGATIVE: 1
ALLERGIC/IMMUNOLOGIC NEGATIVE: 1
RESPIRATORY NEGATIVE: 1

## 2023-05-17 NOTE — PROGRESS NOTES
1. Have you been to the ER, urgent care clinic since your last visit? Hospitalized since your last visit? No    2. Have you seen or consulted any other health care providers outside of the [address] [place] [address] since your last visit? Include any pap smears or colon screening.  No        Chief Complaint   Patient presents with    Medication Refill     Modafinil 200 mg

## 2023-05-17 NOTE — PROGRESS NOTES
Consent: Ana Adams, who was seen by synchronous (real-time) audio-video technology, and/or his healthcare decision maker, is aware that this patient-initiated, Telehealth encounter on 5/17/2023 is a billable service, with coverage as determined by his insurance carrier. He is aware that he may receive a bill and has provided verbal consent to proceed: Yes. Assessment & Plan:   Louie Farfan was seen today for medication refill. Diagnoses and all orders for this visit:    Circadian rhythm sleep disorder, shift work type  -     modafinil (PROVIGIL) 200 MG tablet; 200 mg as a single dose ~1 hour prior to start of work shift. Follow-up and Dispositions    Return in about 3 months (around 8/17/2023). I ADVISED PATIENT TO GO TO ER IF SYMPTOMS WORSEN , CHANGE OR FAILS TO IMPROVE. I spent at least 15 minutes with this established patient, and >50% of the time was spent counseling and/or coordinating care regarding SEE BELOW  [id number]  Subjective:   Ana Adams is a 53 y.o. 1975 male  established patient, who was seen for Medication Refill (Modafinil 200 mg )          Diagnoses and all orders for this visit:  Circadian rhythm sleep disorder, shift work type  Patient is being seen for follow-up visit of shiftwork disorder. Patient is currently working 2 jobs. He has a business of dog training. Second job is working as a . 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 He is currently taking modafinil to help with symptoms. Reports no side effects from current dose. 30-day supply with 2 refills given.  reviewed today. Prior to Admission medications    Medication Sig Start Date End Date Taking?  Authorizing Provider   omega-3 acid ethyl esters (LOVAZA) 1 g capsule  4/26/23  Yes Historical Provider, MD   fluticasone (FLONASE) 50 MCG/ACT nasal spray 1 spray(s) intranasally once a day for 30 day(s) 3/20/17  Yes

## 2023-07-21 ENCOUNTER — HOSPITAL ENCOUNTER (EMERGENCY)
Facility: HOSPITAL | Age: 48
Discharge: LEFT BEFORE DISCHARGE | End: 2023-07-21

## 2023-07-21 ENCOUNTER — HOSPITAL ENCOUNTER (EMERGENCY)
Facility: HOSPITAL | Age: 48
Discharge: HOME | End: 2023-07-21
Attending: EMERGENCY MEDICINE
Payer: COMMERCIAL

## 2023-07-21 VITALS
DIASTOLIC BLOOD PRESSURE: 89 MMHG | WEIGHT: 283.29 LBS | TEMPERATURE: 98.1 F | BODY MASS INDEX: 32.78 KG/M2 | HEART RATE: 84 BPM | HEIGHT: 78 IN | SYSTOLIC BLOOD PRESSURE: 148 MMHG | RESPIRATION RATE: 16 BRPM | OXYGEN SATURATION: 96 %

## 2023-07-21 DIAGNOSIS — R73.9 HYPERGLYCEMIA: Primary | ICD-10-CM

## 2023-07-21 LAB
ALBUMIN SERPL-MCNC: 3.8 G/DL (ref 3.5–5)
ALBUMIN/GLOB SERPL: 1.2 (ref 1.1–2.2)
ALP SERPL-CCNC: 65 U/L (ref 45–117)
ALT SERPL-CCNC: 40 U/L (ref 12–78)
ANION GAP SERPL CALC-SCNC: 0 MMOL/L (ref 5–15)
AST SERPL-CCNC: 18 U/L (ref 15–37)
BASOPHILS # BLD: 0 K/UL (ref 0–0.1)
BASOPHILS NFR BLD: 1 % (ref 0–1)
BILIRUB SERPL-MCNC: 0.2 MG/DL (ref 0.2–1)
BUN SERPL-MCNC: 21 MG/DL (ref 6–20)
BUN/CREAT SERPL: 21 (ref 12–20)
CALCIUM SERPL-MCNC: 8.4 MG/DL (ref 8.5–10.1)
CHLORIDE SERPL-SCNC: 101 MMOL/L (ref 97–108)
CO2 SERPL-SCNC: 27 MMOL/L (ref 21–32)
CREAT SERPL-MCNC: 1.02 MG/DL (ref 0.7–1.3)
DIFFERENTIAL METHOD BLD: NORMAL
EOSINOPHIL # BLD: 0.3 K/UL (ref 0–0.4)
EOSINOPHIL NFR BLD: 5 % (ref 0–7)
ERYTHROCYTE [DISTWIDTH] IN BLOOD BY AUTOMATED COUNT: 12.3 % (ref 11.5–14.5)
GLOBULIN SER CALC-MCNC: 3.3 G/DL (ref 2–4)
GLUCOSE BLD STRIP.AUTO-MCNC: 247 MG/DL (ref 65–117)
GLUCOSE SERPL-MCNC: 209 MG/DL (ref 65–100)
HCT VFR BLD AUTO: 41.6 % (ref 36.6–50.3)
HGB BLD-MCNC: 14.3 G/DL (ref 12.1–17)
IMM GRANULOCYTES # BLD AUTO: 0 K/UL (ref 0–0.04)
IMM GRANULOCYTES NFR BLD AUTO: 0 % (ref 0–0.5)
LYMPHOCYTES # BLD: 2.5 K/UL (ref 0.8–3.5)
LYMPHOCYTES NFR BLD: 40 % (ref 12–49)
MCH RBC QN AUTO: 32.1 PG (ref 26–34)
MCHC RBC AUTO-ENTMCNC: 34.4 G/DL (ref 30–36.5)
MCV RBC AUTO: 93.5 FL (ref 80–99)
MONOCYTES # BLD: 0.5 K/UL (ref 0–1)
MONOCYTES NFR BLD: 8 % (ref 5–13)
NEUTS SEG # BLD: 2.9 K/UL (ref 1.8–8)
NEUTS SEG NFR BLD: 46 % (ref 32–75)
NRBC # BLD: 0 K/UL (ref 0–0.01)
NRBC BLD-RTO: 0 PER 100 WBC
PLATELET # BLD AUTO: 170 K/UL (ref 150–400)
PMV BLD AUTO: 10.4 FL (ref 8.9–12.9)
POTASSIUM SERPL-SCNC: 3.8 MMOL/L (ref 3.5–5.1)
PROT SERPL-MCNC: 7.1 G/DL (ref 6.4–8.2)
RBC # BLD AUTO: 4.45 M/UL (ref 4.1–5.7)
SERVICE CMNT-IMP: ABNORMAL
SODIUM SERPL-SCNC: 128 MMOL/L (ref 136–145)
WBC # BLD AUTO: 6.3 K/UL (ref 4.1–11.1)

## 2023-07-21 PROCEDURE — 83036 HEMOGLOBIN GLYCOSYLATED A1C: CPT

## 2023-07-21 PROCEDURE — 99283 EMERGENCY DEPT VISIT LOW MDM: CPT

## 2023-07-21 PROCEDURE — 36415 COLL VENOUS BLD VENIPUNCTURE: CPT

## 2023-07-21 PROCEDURE — 80053 COMPREHEN METABOLIC PANEL: CPT

## 2023-07-21 PROCEDURE — 85025 COMPLETE CBC W/AUTO DIFF WBC: CPT

## 2023-07-21 PROCEDURE — 82962 GLUCOSE BLOOD TEST: CPT

## 2023-07-22 LAB
EST. AVERAGE GLUCOSE BLD GHB EST-MCNC: 128 MG/DL
HBA1C MFR BLD: 6.1 % (ref 4–5.6)

## 2023-07-22 NOTE — ED NOTES
The patient was discharged home in stable condition. The patient is alert and oriented, in no respiratory distress and discharge vital signs obtained. The patient's diagnosis, condition and treatment were explained. The patient expressed understanding. Prescriptions e-scribed to pharmacy. No work/school note given. A discharge plan has been developed. A  was not involved in the process. Aftercare instructions were given. Pt ambulatory out of the ED.        Caesar Llamas RN  63/90/06 0215

## 2023-07-22 NOTE — DISCHARGE INSTRUCTIONS
Metformin is used to treat diabetes. It can cause upset stomach when you first started. We recommend following up with your primary care doctor for results of lab testing today which will not return tonight.

## 2023-07-22 NOTE — ED PROVIDER NOTES
[facility] EMERGENCY DEPT  EMERGENCY DEPARTMENT ENCOUNTER      Pt Name: Claudia Israel  MRN: [id number]  [address] [facility] 1975  Date of evaluation: 7/21/2023  Provider: Yenifer Wong MD      HISTORY OF PRESENT ILLNESS      45-year-old male with history of anxiety, nicotine use, panic attacks presents to the emergency department with concern for elevated blood sugar at home between 190 and 300 for the past several days. Was told by nurse advice line to come in to be seen. The history is provided by the patient and medical records. Nursing Notes were reviewed. REVIEW OF SYSTEMS         Review of Systems        PAST MEDICAL HISTORY     Past Medical History:   Diagnosis Date    Anxiety     Nicotine vapor product user     Panic attacks          SURGICAL HISTORY     History reviewed. No pertinent surgical history. CURRENT MEDICATIONS       Previous Medications    FLUTICASONE (FLONASE) 50 MCG/ACT NASAL SPRAY    1 spray(s) intranasally once a day for 30 day(s)    LANCETS MISC    Check blood glucose daily    MODAFINIL (PROVIGIL) 200 MG TABLET    200 mg as a single dose ~1 hour prior to start of work shift.     OMEGA-3 ACID ETHYL ESTERS (LOVAZA) 1 G CAPSULE        TURMERIC (QC TUMERIC COMPLEX PO)    Take by mouth    VORTIOXETINE (TRINTELLIX) 10 MG TABS TABLET    ceived the following from [facility]: Outside name: Trintellix 10 mg tablet       ALLERGIES     Fluoxetine, Diclofenac, Levofloxacin, and Naproxen    FAMILY HISTORY       Family History   Problem Relation Age of Onset    No Known Problems Mother     Suicide Maternal Aunt     No Known Problems Father           SOCIAL HISTORY       Social History     Socioeconomic History    Marital status: Single     Spouse name: None    Number of children: None    Years of education: None    Highest education level: None   Tobacco Use    Smoking status: Never    Smokeless tobacco: Former    Tobacco comments:     Quit smoking: Vaping   Substance and

## 2023-07-22 NOTE — ED TRIAGE NOTES
Pt ambulates to treatment area without any gait disturbances. Pt states that around 1830 he called a nurse line to get evaluated and was told to come in to have his blood sugar checked. Pt states that in the past he was at risk for diabetes but changed his eating and numbers were better. Pt states that this past week he has been feeling off, blood sugar has been ranging between 190 and 310.

## 2023-07-27 ENCOUNTER — OFFICE VISIT (OUTPATIENT)
Facility: CLINIC | Age: 48
End: 2023-07-27
Payer: COMMERCIAL

## 2023-07-27 VITALS
WEIGHT: 281 LBS | TEMPERATURE: 97.9 F | DIASTOLIC BLOOD PRESSURE: 78 MMHG | BODY MASS INDEX: 32.51 KG/M2 | HEIGHT: 78 IN | SYSTOLIC BLOOD PRESSURE: 121 MMHG | RESPIRATION RATE: 16 BRPM | HEART RATE: 86 BPM | OXYGEN SATURATION: 99 %

## 2023-07-27 DIAGNOSIS — E11.9 TYPE 2 DIABETES MELLITUS WITHOUT COMPLICATION, WITHOUT LONG-TERM CURRENT USE OF INSULIN (HCC): Primary | ICD-10-CM

## 2023-07-27 DIAGNOSIS — R53.83 LOW ENERGY: ICD-10-CM

## 2023-07-27 DIAGNOSIS — G47.26 SHIFT WORK SLEEP DISORDER: ICD-10-CM

## 2023-07-27 DIAGNOSIS — E78.2 MIXED HYPERLIPIDEMIA: ICD-10-CM

## 2023-07-27 LAB
25(OH)D3 SERPL-MCNC: 14.1 NG/ML (ref 30–100)
ANION GAP SERPL CALC-SCNC: 6 MMOL/L (ref 5–15)
BUN SERPL-MCNC: 18 MG/DL (ref 6–20)
BUN/CREAT SERPL: 19 (ref 12–20)
CALCIUM SERPL-MCNC: 9.3 MG/DL (ref 8.5–10.1)
CHLORIDE SERPL-SCNC: 104 MMOL/L (ref 97–108)
CHOLEST SERPL-MCNC: 227 MG/DL
CO2 SERPL-SCNC: 28 MMOL/L (ref 21–32)
CREAT SERPL-MCNC: 0.93 MG/DL (ref 0.7–1.3)
GLUCOSE SERPL-MCNC: 126 MG/DL (ref 65–100)
HDLC SERPL-MCNC: 53 MG/DL
HDLC SERPL: 4.3 (ref 0–5)
LDLC SERPL CALC-MCNC: 145.8 MG/DL (ref 0–100)
POTASSIUM SERPL-SCNC: 4.2 MMOL/L (ref 3.5–5.1)
SODIUM SERPL-SCNC: 138 MMOL/L (ref 136–145)
TRIGL SERPL-MCNC: 141 MG/DL
TSH SERPL DL<=0.05 MIU/L-ACNC: 1.78 UIU/ML (ref 0.36–3.74)
VLDLC SERPL CALC-MCNC: 28.2 MG/DL

## 2023-07-27 PROCEDURE — 99214 OFFICE O/P EST MOD 30 MIN: CPT | Performed by: STUDENT IN AN ORGANIZED HEALTH CARE EDUCATION/TRAINING PROGRAM

## 2023-07-27 PROCEDURE — 3044F HG A1C LEVEL LT 7.0%: CPT | Performed by: STUDENT IN AN ORGANIZED HEALTH CARE EDUCATION/TRAINING PROGRAM

## 2023-07-27 ASSESSMENT — ENCOUNTER SYMPTOMS
SHORTNESS OF BREATH: 0
VOMITING: 0
COUGH: 0
NAUSEA: 0
ABDOMINAL PAIN: 0
RHINORRHEA: 0

## 2023-07-27 NOTE — PROGRESS NOTES
Identified pt with two pt identifiers(name and ). Reviewed record in preparation for visit and have obtained necessary documentation. Chief Complaint   Patient presents with    Follow-up     ER        Health Maintenance Due   Topic    COVID-19 Vaccine (1)    Pneumococcal 0-64 years Vaccine (1 - PCV)    Diabetic foot exam     HIV screen     Diabetic retinal exam     Hepatitis B vaccine (1 of 3 - Risk 3-dose series)    Colorectal Cancer Screen        Coordination of Care Questionnaire:  :   1) Have you been to an emergency room, urgent care, or hospitalized since your last visit? If yes, where when, and reason for visit? Yes, [place] [facility]       2. Have seen or consulted any other health care provider since your last visit? If yes, where when, and reason for visit? NO        Patient is accompanied by self I have received verbal consent from Desi Franco to discuss any/all medical information while they are present in the room.
Year: Never true    [address] in the Last Year: Never true   Transportation Needs: Unknown    Lack of Transportation (Medical): Not on file    Lack of Transportation (Non-Medical): No   Physical Activity: Not on file   Stress: Not on file   Social Connections: Not on file   Intimate Partner Violence: Not on file   Housing Stability: Unknown    Unable to Pay for Housing in the Last Year: Not on file    Number of Places Lived in the Last Year: Not on file    Unstable Housing in the Last Year: No        Patient is a 81-year-old male who presents to the office today for ER follow-up. Patient was recently evaluated in the emergency room due to hyperglycemia. He had been checking his sugars at home and noted readings as high as 310 to the mid 200s. In the emergency room he was started on metformin twice daily. He states his current sugars are currently ranging about 160-190 although he has only been on the metformin for 1 week. Also of note patient endorses a history of overall low energy. He would like his testosterone level checked. He is uncertain if this could be related to a mood disorder or underlying metabolic disorder. 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 He states before the modafinil he had significant difficulty adjusting to his work schedule and this lead to brain fog and fatigue. He states since being on this medication he has noted a significant difference. He denies any side effects. ROS:   Review of Systems   Constitutional:  Negative for chills and fever. HENT:  Negative for rhinorrhea. Respiratory:  Negative for cough and shortness of breath. Cardiovascular:  Negative for chest pain and leg swelling. Gastrointestinal:  Negative for abdominal pain, nausea and vomiting. Neurological:  Negative for dizziness, weakness, numbness and headaches.        Objective:     Vitals:    07/27/23 1020   BP: 121/78   Pulse: 86

## 2023-07-29 LAB — TESTOST SERPL-MCNC: 295 NG/DL (ref 264–916)

## 2023-07-31 ENCOUNTER — TELEPHONE (OUTPATIENT)
Facility: CLINIC | Age: 48
End: 2023-07-31

## 2023-07-31 DIAGNOSIS — E11.9 TYPE 2 DIABETES MELLITUS WITHOUT COMPLICATION, WITHOUT LONG-TERM CURRENT USE OF INSULIN (HCC): ICD-10-CM

## 2023-07-31 DIAGNOSIS — E55.9 VITAMIN D DEFICIENCY: Primary | ICD-10-CM

## 2023-07-31 RX ORDER — GLUCOSAMINE HCL/CHONDROITIN SU 500-400 MG
CAPSULE ORAL
Qty: 100 STRIP | Refills: 2 | Status: SHIPPED | OUTPATIENT
Start: 2023-07-31

## 2023-07-31 NOTE — TELEPHONE ENCOUNTER
----- Message from Suresh Vidales MD sent at 7/30/2023  9:17 PM EDT -----  Please call patient and notify him that his vitamin D level is low. Therefore I would like to send in vitamin D3 50,000 units for him to take once per week for 12 weeks. Please let me know if patient is agreeable and what pharmacy he would like this sent to.

## 2023-08-03 LAB
TESTOST FREE SERPL-MCNC: 5.7 PG/ML (ref 6.8–21.5)
TESTOST SERPL-MCNC: 295 NG/DL (ref 264–916)

## 2023-08-24 ENCOUNTER — OFFICE VISIT (OUTPATIENT)
Facility: CLINIC | Age: 48
End: 2023-08-24
Payer: COMMERCIAL

## 2023-08-24 VITALS
HEIGHT: 78 IN | SYSTOLIC BLOOD PRESSURE: 121 MMHG | DIASTOLIC BLOOD PRESSURE: 83 MMHG | HEART RATE: 84 BPM | RESPIRATION RATE: 16 BRPM | OXYGEN SATURATION: 97 % | BODY MASS INDEX: 32.63 KG/M2 | TEMPERATURE: 96.9 F | WEIGHT: 282 LBS

## 2023-08-24 DIAGNOSIS — R79.89 LOW TESTOSTERONE IN MALE: ICD-10-CM

## 2023-08-24 DIAGNOSIS — E11.9 TYPE 2 DIABETES MELLITUS WITHOUT COMPLICATION, WITHOUT LONG-TERM CURRENT USE OF INSULIN (HCC): Primary | ICD-10-CM

## 2023-08-24 DIAGNOSIS — E78.2 MIXED HYPERLIPIDEMIA: ICD-10-CM

## 2023-08-24 PROBLEM — E55.9 VITAMIN D DEFICIENCY: Status: ACTIVE | Noted: 2023-08-24

## 2023-08-24 PROCEDURE — 3044F HG A1C LEVEL LT 7.0%: CPT | Performed by: STUDENT IN AN ORGANIZED HEALTH CARE EDUCATION/TRAINING PROGRAM

## 2023-08-24 PROCEDURE — 99213 OFFICE O/P EST LOW 20 MIN: CPT | Performed by: STUDENT IN AN ORGANIZED HEALTH CARE EDUCATION/TRAINING PROGRAM

## 2023-08-24 RX ORDER — ERGOCALCIFEROL 1.25 MG/1
CAPSULE ORAL
COMMUNITY
Start: 2023-08-21 | End: 2023-08-24

## 2023-08-24 ASSESSMENT — ENCOUNTER SYMPTOMS
VOMITING: 0
SHORTNESS OF BREATH: 0
COUGH: 0
NAUSEA: 0
RHINORRHEA: 0
ABDOMINAL PAIN: 0

## 2023-08-24 NOTE — PROGRESS NOTES
Assessment/Plan:     Diagnoses and all orders for this visit:    Type 2 diabetes mellitus without complication, without long-term current use of insulin (HCC)  -Chronic. Improving.  -For now continue on current dosage of metformin 500 mg twice daily  -Continue monitoring fasting glucose  -Follow-up in 2 months for reevaluation and repeat A1c check  -If needed can add a GLP-1 to assist with glycemic control and promote weight loss    Mixed hyperlipidemia  -Chronic. Recent lab work noted persistently elevated cholesterol  -Reviewed risk vs benefits of statin especially in the setting of diabetes  -Patient declines statin therapy at this time.  -Continue with dietary and lifestyle modifications    Low testosterone in male  -     Nino Rosenberg MD, Urology, [facility] AND [facility] [address]) - [facility]  -Patient endorses symptoms of low energy  -Has been noting a steady decline in his total testosterone and has had low free testosterone  -Patient has an appointment scheduled with urology next month for further evaluation         Return in about 2 months (around 10/24/2023), or if symptoms worsen or fail to improve. Discussed expected course/resolution/complications of diagnosis in detail with patient. Medication risks/benefits/costs/interactions/alternatives discussed with patient. Pt expressed understanding with the diagnosis and plan      Subjective:      Elana Bishop is a 53 y.o. male who presents for had concerns including 1 Month Follow-Up (Diabetes). Current Outpatient Medications   Medication Sig Dispense Refill    Cholecalciferol (VITAMIN D3) 1.25 MG (76501 UT) TABS Take 1 tablet by mouth every 7 days 12 tablet 0    blood glucose monitor strips Use to check fasting glucose and as needed. Provide brand covered by patient insurance.  100 strip 2    metFORMIN (GLUCOPHAGE) 500 MG tablet Take 1 tablet by mouth 2 times daily (with meals) 180 tablet 0    Turmeric (QC TUMERIC COMPLEX PO) Take 1 tablet by mouth daily

## 2023-08-24 NOTE — PROGRESS NOTES
Identified pt with two pt identifiers(name and ). Reviewed record in preparation for visit and have obtained necessary documentation. Chief Complaint   Patient presents with    1 Month Follow-Up     Diabetes        Health Maintenance Due   Topic    COVID-19 Vaccine (1)    Pneumococcal 0-64 years Vaccine (1 - PCV)    Diabetic foot exam     HIV screen     Diabetic retinal exam     Hepatitis B vaccine (1 of 3 - Risk 3-dose series)    Colorectal Cancer Screen     Flu vaccine (1)       Coordination of Care Questionnaire:  :   1) Have you been to an emergency room, urgent care, or hospitalized since your last visit? If yes, where when, and reason for visit? no      2. Have seen or consulted any other health care provider since your last visit? If yes, where when, and reason for visit?  no        Patient is accompanied by self I have received verbal consent from Ivan Burt to discuss any/all medical information while they are present in the room.

## 2023-10-05 ENCOUNTER — HOSPITAL ENCOUNTER (EMERGENCY)
Facility: HOSPITAL | Age: 48
Discharge: HOME | End: 2023-10-05
Attending: EMERGENCY MEDICINE
Payer: COMMERCIAL

## 2023-10-05 VITALS
SYSTOLIC BLOOD PRESSURE: 138 MMHG | DIASTOLIC BLOOD PRESSURE: 95 MMHG | TEMPERATURE: 98.4 F | HEART RATE: 94 BPM | RESPIRATION RATE: 17 BRPM | HEIGHT: 78 IN | BODY MASS INDEX: 31.82 KG/M2 | WEIGHT: 275 LBS | OXYGEN SATURATION: 97 %

## 2023-10-05 DIAGNOSIS — J06.9 VIRAL URI: Primary | ICD-10-CM

## 2023-10-05 LAB
SARS-COV-2 RDRP RESP QL NAA+PROBE: NOT DETECTED
SOURCE: NORMAL

## 2023-10-05 PROCEDURE — 87635 SARS-COV-2 COVID-19 AMP PRB: CPT

## 2023-10-05 PROCEDURE — 99283 EMERGENCY DEPT VISIT LOW MDM: CPT

## 2023-10-05 ASSESSMENT — PAIN SCALES - GENERAL: PAINLEVEL_OUTOF10: 0

## 2023-10-05 ASSESSMENT — LIFESTYLE VARIABLES: HOW OFTEN DO YOU HAVE A DRINK CONTAINING ALCOHOL: PATIENT DECLINED

## 2023-10-05 ASSESSMENT — PAIN - FUNCTIONAL ASSESSMENT: PAIN_FUNCTIONAL_ASSESSMENT: NONE - DENIES PAIN

## 2023-10-05 NOTE — ED PROVIDER NOTES
[facility] EMERGENCY DEPT  EMERGENCY DEPARTMENT ENCOUNTER      Pt Name: Natasha Andrade  MRN: [id number]  [address] [facility] 1975  Date of evaluation: 10/5/2023  Provider: Farheen Riggins MD    [address]       Chief Complaint   Patient presents with    Nasal Congestion         HISTORY OF PRESENT ILLNESS   (Location/Symptom, Timing/Onset, Context/Setting, Quality, Duration, Modifying Factors, Severity)  Note limiting factors. 50-year-old male with PMHx of anxiety, panic attacks presents to the emergency department complaining of diaphoresis and a \"hot feeling\" that extends from his neck down his back for the last few days. He also complains of nasal congestion and diarrhea for the last 2 days. He notes that his mother was diagnosed with COVID 4 days ago. He has no additional complaints at this time. The history is provided by the patient. Review of External Medical Records:     Nursing Notes were reviewed. REVIEW OF SYSTEMS    (2-9 systems for level 4, 10 or more for level 5)     Review of Systems   Constitutional:  Positive for diaphoresis. HENT:  Positive for congestion. Eyes: Negative. Respiratory: Negative. Cardiovascular: Negative. Gastrointestinal: Negative. Genitourinary: Negative. Musculoskeletal: Negative. Skin: Negative. Neurological: Negative. Psychiatric/Behavioral: Negative. Except as noted above the remainder of the review of systems was reviewed and negative. PAST MEDICAL HISTORY     Past Medical History:   Diagnosis Date    Anxiety     Nicotine vapor product user     Panic attacks          SURGICAL HISTORY     History reviewed. No pertinent surgical history. CURRENT MEDICATIONS       Previous Medications    BLOOD GLUCOSE MONITOR STRIPS    Use to check fasting glucose and as needed. Provide brand covered by patient insurance.     CHOLECALCIFEROL (VITAMIN D3) 1.25 MG (94440 UT) TABS    Take 1 tablet by mouth every 7 days    FLUTICASONE

## 2023-10-05 NOTE — DISCHARGE INSTRUCTIONS
You were seen in the emergency department for flulike symptoms. The results of your tests were pending at the time of your discharge. Although an exact cause of your symptoms was not identified, the most likely cause is a viral illness. Please take ibuprofen and Tylenol for fever or pain and hydrate copiously over the next few days. Please follow-up with your PCP or return to the emergency department if you experience a worsening of symptoms or any new symptoms that are concerning to you.

## 2023-10-05 NOTE — ED NOTES
Discharge instructions signed by patient; denies further questions.       Gopal Mays RN  10/05/23 8551

## 2023-10-05 NOTE — ED TRIAGE NOTES
Patient presents ambulatory to treatment area with a steady gait. Patient states he was exposed to Covid while taking his mother (who tested positive for covid on Sunday) to the ER on Sunday. Patient states he has anxiety and has felt hot and tingly all over since that time. Patient also reports diarrhea and some nasal congestion. No known fevers.

## 2023-10-06 ASSESSMENT — ENCOUNTER SYMPTOMS
GASTROINTESTINAL NEGATIVE: 1
RESPIRATORY NEGATIVE: 1
EYES NEGATIVE: 1

## 2023-10-24 ENCOUNTER — OFFICE VISIT (OUTPATIENT)
Facility: CLINIC | Age: 48
End: 2023-10-24
Payer: COMMERCIAL

## 2023-10-24 VITALS
RESPIRATION RATE: 16 BRPM | HEIGHT: 78 IN | OXYGEN SATURATION: 96 % | HEART RATE: 83 BPM | SYSTOLIC BLOOD PRESSURE: 125 MMHG | DIASTOLIC BLOOD PRESSURE: 85 MMHG | WEIGHT: 266 LBS | TEMPERATURE: 97.3 F | BODY MASS INDEX: 30.78 KG/M2

## 2023-10-24 DIAGNOSIS — Z12.11 COLON CANCER SCREENING: ICD-10-CM

## 2023-10-24 DIAGNOSIS — G47.26 SHIFT WORK SLEEP DISORDER: ICD-10-CM

## 2023-10-24 DIAGNOSIS — E55.9 VITAMIN D DEFICIENCY: ICD-10-CM

## 2023-10-24 DIAGNOSIS — E11.9 TYPE 2 DIABETES MELLITUS WITHOUT COMPLICATION, WITHOUT LONG-TERM CURRENT USE OF INSULIN (HCC): Primary | ICD-10-CM

## 2023-10-24 PROCEDURE — 99214 OFFICE O/P EST MOD 30 MIN: CPT | Performed by: STUDENT IN AN ORGANIZED HEALTH CARE EDUCATION/TRAINING PROGRAM

## 2023-10-24 PROCEDURE — 3044F HG A1C LEVEL LT 7.0%: CPT | Performed by: STUDENT IN AN ORGANIZED HEALTH CARE EDUCATION/TRAINING PROGRAM

## 2023-10-24 RX ORDER — BLOOD-GLUCOSE METER
KIT MISCELLANEOUS
Qty: 1 KIT | Refills: 0 | Status: SHIPPED | OUTPATIENT
Start: 2023-10-24

## 2023-10-24 RX ORDER — LANCETS 30 GAUGE
EACH MISCELLANEOUS
Qty: 100 EACH | Refills: 2 | Status: SHIPPED | OUTPATIENT
Start: 2023-10-24

## 2023-10-24 RX ORDER — GLUCOSAMINE HCL/CHONDROITIN SU 500-400 MG
CAPSULE ORAL
Qty: 100 STRIP | Refills: 2 | Status: SHIPPED | OUTPATIENT
Start: 2023-10-24

## 2023-10-24 RX ORDER — MODAFINIL 200 MG/1
200 TABLET ORAL DAILY
COMMUNITY

## 2023-10-24 ASSESSMENT — ENCOUNTER SYMPTOMS
ABDOMINAL PAIN: 0
SHORTNESS OF BREATH: 0
RHINORRHEA: 0
VOMITING: 0
COUGH: 0
NAUSEA: 0

## 2023-10-24 NOTE — PROGRESS NOTES
Identified pt with two pt identifiers(name and ). Reviewed record in preparation for visit and have obtained necessary documentation. Chief Complaint   Patient presents with    Follow-up     2 month; diabetes  Requesting for a new reader (one touch ultra)         Health Maintenance Due   Topic    Hepatitis B vaccine (1 of 3 - 3-dose series)    COVID-19 Vaccine (1)    Pneumococcal 0-64 years Vaccine (1 - PCV)    Diabetic foot exam     HIV screen     Diabetic retinal exam     Colorectal Cancer Screen     Flu vaccine (1)       Coordination of Care Questionnaire:  :   1) Have you been to an emergency room, urgent care, or hospitalized since your last visit? If yes, where when, and reason for visit? Yes, [facility] urgent care for covid tested negative       2. Have seen or consulted any other health care provider since your last visit? If yes, where when, and reason for visit?  no        Patient is accompanied by self I have received verbal consent from Jeimy Garcia to discuss any/all medical information while they are present in the room.
Smokeless tobacco: Former   Vaping Use    Vaping Use: Never used   Substance and Sexual Activity    Alcohol use: Yes     Comment: rarely    Drug use: Not Currently     Types: Marijuana Dorcus Sjogren)    Sexual activity: Not on file   Other Topics Concern    Not on file   Social History Narrative    Not on file     Social Determinants of Health     Financial Resource Strain: Low Risk  (5/17/2023)    Overall Financial Resource Strain (CARDIA)     Difficulty of Paying Living Expenses: Not hard at all   Food Insecurity: No Food Insecurity (5/17/2023)    Hunger Vital Sign     Worried About Running Out of Food in the Last Year: Never true     [address] in the Last Year: Never true   Transportation Needs: Unknown (5/17/2023)    PRAPARE - Transportation     Lack of Transportation (Medical): Not on file     Lack of Transportation (Non-Medical): No   Physical Activity: Not on file   Stress: Not on file   Social Connections: Not on file   Intimate Partner Violence: Not on file   Housing Stability: Unknown (5/17/2023)    Housing Stability Vital Sign     Unable to Pay for Housing in the Last Year: Not on file     Number of Places Lived in the Last Year: Not on file     Unstable Housing in the Last Year: No        Patient is a 50-year-old male who presents to the office today for follow-up of diabetes and sleep shiftwork disorder. Patient has a history of sleep shiftwork disorder and is currently on modafinil. He states before the modafinil he had significant difficulty adjusting to his work schedule which led to brain fog and fatigue. He states since being on this medication he has noted a significant difference and denies any side effects from the medication. He also has a history of anxiety that is currently being managed and treated by his psychiatrist.  Patient has a history of diabetes and is making dietary and lifestyle modifications as well as working on losing weight.   He does have concerns however that his

## 2023-11-01 ENCOUNTER — TELEPHONE (OUTPATIENT)
Facility: CLINIC | Age: 48
End: 2023-11-01

## 2023-11-22 ENCOUNTER — TELEPHONE (OUTPATIENT)
Facility: CLINIC | Age: 48
End: 2023-11-22

## 2023-11-22 DIAGNOSIS — E11.9 TYPE 2 DIABETES MELLITUS WITHOUT COMPLICATION, WITHOUT LONG-TERM CURRENT USE OF INSULIN (HCC): ICD-10-CM

## 2023-11-22 NOTE — TELEPHONE ENCOUNTER
PCP: Jillian Moy MD    Last appt: [unfilled]  Future Appointments   Date Time Provider Department Center   1/24/2024  9:20 AM Jillian Moy MD [facility]       Requested Prescriptions     Pending Prescriptions Disp Refills    metFORMIN (GLUCOPHAGE) 500 MG tablet 180 tablet 0     Sig: Take 1 tablet by mouth 2 times daily (with meals)       Prior labs and Blood pressures:  BP Readings from Last 3 Encounters:   10/24/23 125/85   10/05/23 (!) 138/95   08/24/23 121/83     Lab Results   Component Value Date/Time     10/24/2023 10:57 AM    K 4.5 10/24/2023 10:57 AM     10/24/2023 10:57 AM    CO2 25 10/24/2023 10:57 AM    BUN 24 10/24/2023 10:57 AM    GFRAA >60 09/13/2022 08:58 AM     No results found for: \"HBA1C\", \"SGT9QCCZ\"  Lab Results   Component Value Date/Time    CHOL 227 07/27/2023 11:04 AM    HDL 53 07/27/2023 11:04 AM     No results found for: \"VITD3\", \"VD3RIA\"    Lab Results   Component Value Date/Time    TSH 2.580 09/13/2022 08:58 AM

## 2023-11-22 NOTE — TELEPHONE ENCOUNTER
Pt called requesting a refills on metformin. Pt stated the request was sent via fax and electronically. I wasn't able to see any electronics requests. But pt is completely out.     [facility] [id number] - [place], [place] - [address] - P [phone] - F [phone]

## 2024-01-24 ENCOUNTER — OFFICE VISIT (OUTPATIENT)
Facility: CLINIC | Age: 49
End: 2024-01-24
Payer: COMMERCIAL

## 2024-01-24 VITALS
DIASTOLIC BLOOD PRESSURE: 81 MMHG | OXYGEN SATURATION: 100 % | BODY MASS INDEX: 31.24 KG/M2 | SYSTOLIC BLOOD PRESSURE: 126 MMHG | HEART RATE: 65 BPM | TEMPERATURE: 97.6 F | WEIGHT: 270 LBS | RESPIRATION RATE: 16 BRPM | HEIGHT: 78 IN

## 2024-01-24 DIAGNOSIS — E11.9 TYPE 2 DIABETES MELLITUS WITHOUT COMPLICATION, WITHOUT LONG-TERM CURRENT USE OF INSULIN (HCC): Primary | ICD-10-CM

## 2024-01-24 DIAGNOSIS — R23.4 PEELING SKIN: ICD-10-CM

## 2024-01-24 DIAGNOSIS — G47.26 SHIFT WORK SLEEP DISORDER: ICD-10-CM

## 2024-01-24 LAB — HBA1C MFR BLD: 5.6 %

## 2024-01-24 PROCEDURE — 83036 HEMOGLOBIN GLYCOSYLATED A1C: CPT | Performed by: STUDENT IN AN ORGANIZED HEALTH CARE EDUCATION/TRAINING PROGRAM

## 2024-01-24 PROCEDURE — 99214 OFFICE O/P EST MOD 30 MIN: CPT | Performed by: STUDENT IN AN ORGANIZED HEALTH CARE EDUCATION/TRAINING PROGRAM

## 2024-01-24 ASSESSMENT — PATIENT HEALTH QUESTIONNAIRE - PHQ9
6. FEELING BAD ABOUT YOURSELF - OR THAT YOU ARE A FAILURE OR HAVE LET YOURSELF OR YOUR FAMILY DOWN: 0
4. FEELING TIRED OR HAVING LITTLE ENERGY: 0
SUM OF ALL RESPONSES TO PHQ QUESTIONS 1-9: 0
SUM OF ALL RESPONSES TO PHQ9 QUESTIONS 1 & 2: 0
1. LITTLE INTEREST OR PLEASURE IN DOING THINGS: 0
SUM OF ALL RESPONSES TO PHQ QUESTIONS 1-9: 0
2. FEELING DOWN, DEPRESSED OR HOPELESS: 0
8. MOVING OR SPEAKING SO SLOWLY THAT OTHER PEOPLE COULD HAVE NOTICED. OR THE OPPOSITE, BEING SO FIGETY OR RESTLESS THAT YOU HAVE BEEN MOVING AROUND A LOT MORE THAN USUAL: 0
10. IF YOU CHECKED OFF ANY PROBLEMS, HOW DIFFICULT HAVE THESE PROBLEMS MADE IT FOR YOU TO DO YOUR WORK, TAKE CARE OF THINGS AT HOME, OR GET ALONG WITH OTHER PEOPLE: 0
7. TROUBLE CONCENTRATING ON THINGS, SUCH AS READING THE NEWSPAPER OR WATCHING TELEVISION: 0
3. TROUBLE FALLING OR STAYING ASLEEP: 0
5. POOR APPETITE OR OVEREATING: 0
SUM OF ALL RESPONSES TO PHQ QUESTIONS 1-9: 0
9. THOUGHTS THAT YOU WOULD BE BETTER OFF DEAD, OR OF HURTING YOURSELF: 0
SUM OF ALL RESPONSES TO PHQ QUESTIONS 1-9: 0

## 2024-01-24 ASSESSMENT — ENCOUNTER SYMPTOMS
ABDOMINAL PAIN: 0
VOMITING: 0
NAUSEA: 0
SHORTNESS OF BREATH: 0
RHINORRHEA: 0
COUGH: 0

## 2024-01-24 NOTE — PROGRESS NOTES
Identified pt with two pt identifiers(name and ). Reviewed record in preparation for visit and have obtained necessary documentation.  Chief Complaint   Patient presents with    3 Month Follow-Up     Diabetes        Health Maintenance Due   Topic    Hepatitis B vaccine (1 of 3 - 3-dose series)    COVID-19 Vaccine (1)    Pneumococcal 0-64 years Vaccine (1 - PCV)    HIV screen     Diabetic retinal exam     Colorectal Cancer Screen     Flu vaccine (1)    Diabetic Alb to Cr ratio (uACR) test        Coordination of Care Questionnaire:  :   1) Have you been to an emergency room, urgent care, or hospitalized since your last visit?  If yes, where when, and reason for visit? no      2. Have seen or consulted any other health care provider since your last visit?   If yes, where when, and reason for visit?  no        Patient is accompanied by self I have received verbal consent from Simba Ring to discuss any/all medical information while they are present in the room.    
monitor strips Use to check fasting blood sugar daily and as needed. Provide brand covered by patient insurance. 100 strip 2    vitamin D (CHOLECALCIFEROL) 25 MCG (1000 UT) TABS tablet Take 1 tablet by mouth daily      fluticasone (FLONASE) 50 MCG/ACT nasal spray 2 sprays by Nasal route daily as needed       No current facility-administered medications for this visit.       Allergies   Allergen Reactions    Fluoxetine Other (See Comments)     Induced eben    Diclofenac Swelling     Leg swelling    Levofloxacin Other (See Comments)     Pins and needles sensation to arms and legs    Naproxen Swelling     Leg swelling     Past Medical History:   Diagnosis Date    Anxiety     Nicotine vapor product user     Panic attacks       History reviewed. No pertinent surgical history.   Family History   Problem Relation Age of Onset    No Known Problems Mother     Suicide Maternal Aunt     No Known Problems Father       Social History     Socioeconomic History    Marital status: Single     Spouse name: Not on file    Number of children: Not on file    Years of education: Not on file    Highest education level: Not on file   Occupational History    Not on file   Tobacco Use    Smoking status: Never    Smokeless tobacco: Former   Vaping Use    Vaping Use: Never used   Substance and Sexual Activity    Alcohol use: Yes     Comment: rarely    Drug use: Not Currently     Types: Marijuana (Weed)    Sexual activity: Not on file   Other Topics Concern    Not on file   Social History Narrative    Not on file     Social Determinants of Health     Financial Resource Strain: Low Risk  (5/17/2023)    Overall Financial Resource Strain (CARDIA)     Difficulty of Paying Living Expenses: Not hard at all   Food Insecurity: Not on file (5/17/2023)   Transportation Needs: Unknown (5/17/2023)    PRAPARE - Transportation     Lack of Transportation (Medical): Not on file     Lack of Transportation (Non-Medical): No   Physical Activity: Not on file

## 2024-03-18 ENCOUNTER — TELEPHONE (OUTPATIENT)
Facility: CLINIC | Age: 49
End: 2024-03-18

## 2024-03-18 NOTE — TELEPHONE ENCOUNTER
Patient states that [place] has requested the medication last Tuesday for Modafinil. It has been explained to patient it may have been received just has to be signed off by the provider.

## 2024-03-20 DIAGNOSIS — G47.26 SHIFT WORK SLEEP DISORDER: Primary | ICD-10-CM

## 2024-03-20 RX ORDER — MODAFINIL 200 MG/1
200 TABLET ORAL DAILY
Qty: 30 TABLET | Refills: 1 | Status: SHIPPED | OUTPATIENT
Start: 2024-03-20 | End: 2024-05-07 | Stop reason: SDUPTHER

## 2024-03-20 RX ORDER — MODAFINIL 200 MG/1
200 TABLET ORAL DAILY
COMMUNITY
End: 2024-03-20 | Stop reason: SDUPTHER

## 2024-04-01 ENCOUNTER — CLINICAL DOCUMENTATION (OUTPATIENT)
Facility: CLINIC | Age: 49
End: 2024-04-01

## 2024-04-01 NOTE — PROGRESS NOTES
Status   Sent to [facility]  Drug    Modafinil 200MG tablets   Form    [employer] Electronic PA Form (2017 NCPDP)           Original Claim Info    75 PDL NON-PREFERRED. PA REQCALL [phone], [phone].FOR 3-DAY ER SUPPLY, ENTER [id number] PA TYPE 8DRUG REQUIRES PRIOR AUTHORIZATION

## 2024-04-24 ENCOUNTER — OFFICE VISIT (OUTPATIENT)
Facility: CLINIC | Age: 49
End: 2024-04-24

## 2024-04-24 VITALS
OXYGEN SATURATION: 98 % | HEIGHT: 78 IN | BODY MASS INDEX: 31.93 KG/M2 | HEART RATE: 76 BPM | SYSTOLIC BLOOD PRESSURE: 132 MMHG | TEMPERATURE: 97.2 F | RESPIRATION RATE: 16 BRPM | WEIGHT: 276 LBS | DIASTOLIC BLOOD PRESSURE: 85 MMHG

## 2024-04-24 DIAGNOSIS — E55.9 VITAMIN D DEFICIENCY: ICD-10-CM

## 2024-04-24 DIAGNOSIS — E11.9 TYPE 2 DIABETES MELLITUS WITHOUT COMPLICATION, WITHOUT LONG-TERM CURRENT USE OF INSULIN (HCC): Primary | ICD-10-CM

## 2024-04-24 DIAGNOSIS — E78.2 MIXED HYPERLIPIDEMIA: ICD-10-CM

## 2024-04-24 DIAGNOSIS — G47.26 SHIFT WORK SLEEP DISORDER: ICD-10-CM

## 2024-04-24 LAB
25(OH)D3 SERPL-MCNC: 22.9 NG/ML (ref 30–100)
ALBUMIN SERPL-MCNC: 4.3 G/DL (ref 3.5–5)
ALBUMIN/GLOB SERPL: 1.2 (ref 1.1–2.2)
ALP SERPL-CCNC: 62 U/L (ref 45–117)
ALT SERPL-CCNC: 38 U/L (ref 12–78)
ANION GAP SERPL CALC-SCNC: 6 MMOL/L (ref 5–15)
AST SERPL-CCNC: 22 U/L (ref 15–37)
BILIRUB SERPL-MCNC: 0.4 MG/DL (ref 0.2–1)
BUN SERPL-MCNC: 20 MG/DL (ref 6–20)
BUN/CREAT SERPL: 22 (ref 12–20)
CALCIUM SERPL-MCNC: 9.7 MG/DL (ref 8.5–10.1)
CHLORIDE SERPL-SCNC: 104 MMOL/L (ref 97–108)
CHOLEST SERPL-MCNC: 274 MG/DL
CO2 SERPL-SCNC: 29 MMOL/L (ref 21–32)
CREAT SERPL-MCNC: 0.93 MG/DL (ref 0.7–1.3)
CREAT UR-MCNC: 275 MG/DL
ERYTHROCYTE [DISTWIDTH] IN BLOOD BY AUTOMATED COUNT: 12.7 % (ref 11.5–14.5)
EST. AVERAGE GLUCOSE BLD GHB EST-MCNC: 114 MG/DL
GLOBULIN SER CALC-MCNC: 3.7 G/DL (ref 2–4)
GLUCOSE SERPL-MCNC: 132 MG/DL (ref 65–100)
HBA1C MFR BLD: 5.6 % (ref 4–5.6)
HCT VFR BLD AUTO: 47.6 % (ref 36.6–50.3)
HDLC SERPL-MCNC: 61 MG/DL
HDLC SERPL: 4.5 (ref 0–5)
HGB BLD-MCNC: 16.2 G/DL (ref 12.1–17)
LDLC SERPL CALC-MCNC: 176.8 MG/DL (ref 0–100)
MCH RBC QN AUTO: 32.3 PG (ref 26–34)
MCHC RBC AUTO-ENTMCNC: 34 G/DL (ref 30–36.5)
MCV RBC AUTO: 95 FL (ref 80–99)
MICROALBUMIN UR-MCNC: 3.26 MG/DL
MICROALBUMIN/CREAT UR-RTO: 12 MG/G (ref 0–30)
NRBC # BLD: 0 K/UL (ref 0–0.01)
NRBC BLD-RTO: 0 PER 100 WBC
PLATELET # BLD AUTO: 213 K/UL (ref 150–400)
PMV BLD AUTO: 10.2 FL (ref 8.9–12.9)
POTASSIUM SERPL-SCNC: 4.8 MMOL/L (ref 3.5–5.1)
PROT SERPL-MCNC: 8 G/DL (ref 6.4–8.2)
RBC # BLD AUTO: 5.01 M/UL (ref 4.1–5.7)
SODIUM SERPL-SCNC: 139 MMOL/L (ref 136–145)
TRIGL SERPL-MCNC: 181 MG/DL
TSH SERPL DL<=0.05 MIU/L-ACNC: 2.41 UIU/ML (ref 0.36–3.74)
VLDLC SERPL CALC-MCNC: 36.2 MG/DL
WBC # BLD AUTO: 6.3 K/UL (ref 4.1–11.1)

## 2024-04-24 RX ORDER — SEMAGLUTIDE 0.68 MG/ML
INJECTION, SOLUTION SUBCUTANEOUS
Qty: 9 ML | Refills: 0 | Status: SHIPPED | OUTPATIENT
Start: 2024-04-24 | End: 2024-07-17

## 2024-04-24 ASSESSMENT — ENCOUNTER SYMPTOMS
ABDOMINAL PAIN: 0
RHINORRHEA: 0
NAUSEA: 0
VOMITING: 0
COUGH: 0
SHORTNESS OF BREATH: 0

## 2024-04-24 NOTE — PROGRESS NOTES
\"Have you been to the ER, urgent care clinic since your last visit?  Hospitalized since your last visit?\"    no    “Have you seen or consulted any other health care providers outside of [facility] since your last visit?”    no        “Have you had a colorectal cancer screening such as a colonoscopy/FIT/Cologuard?    no    No colonoscopy on file  No cologuard on file  No FIT/FOBT on file   No flexible sigmoidoscopy on file         Click Here for Release of Records Request   
wondering if his allergies which have been flaring are contributing.  Advised to consider possible sleep study.    Diabetes Follow-up:   ----------------------------------------  Home glucose Readings - Fasting glucoses around 110s   Hypoglycemia - No  Current Medications - Metformin 500 mg BID   Yearly eye exam - Advised to schedule   Last Foot Exam - Completed 10/2023 - normal   Microalbumin/Cr - Completed 1/2023 - normal   Statin Therapy - None, declines statin therapy   ACEi/ARB - None      ROS:   Review of Systems   Constitutional:  Negative for chills and fever.   HENT:  Negative for rhinorrhea.    Respiratory:  Negative for cough and shortness of breath.    Cardiovascular:  Negative for chest pain and leg swelling.   Gastrointestinal:  Negative for abdominal pain, nausea and vomiting.   Neurological:  Negative for dizziness, weakness, numbness and headaches.         Objective:     Vitals:    04/24/24 0914   BP: 132/85   Pulse: 76   Resp: 16   Temp: 97.2 °F (36.2 °C)   SpO2: 98%          Vitals and Nurse Documentation reviewed.     Physical Exam  Constitutional:       General: He is not in acute distress.     Appearance: Normal appearance. He is obese. He is not ill-appearing.   HENT:      Head: Normocephalic and atraumatic.   Eyes:      Conjunctiva/sclera: Conjunctivae normal.   Cardiovascular:      Rate and Rhythm: Normal rate and regular rhythm.      Heart sounds: Normal heart sounds. No murmur heard.     No friction rub. No gallop.   Pulmonary:      Effort: Pulmonary effort is normal. No respiratory distress.      Breath sounds: Normal breath sounds. No wheezing, rhonchi or rales.   Abdominal:      General: Bowel sounds are normal. There is no distension.      Palpations: Abdomen is soft.      Tenderness: There is no abdominal tenderness. There is no guarding or rebound.   Musculoskeletal:      Cervical back: Neck supple.      Right lower leg: No edema.      Left lower leg: No edema.   Neurological:

## 2024-04-30 ENCOUNTER — TELEPHONE (OUTPATIENT)
Facility: CLINIC | Age: 49
End: 2024-04-30

## 2024-05-01 NOTE — TELEPHONE ENCOUNTER
PA denied patient must have A1c be 6.5 within the last 12 months also have tried two preferred drugs such as Byetta,Trulicity and Victoza

## 2024-05-06 ENCOUNTER — TELEPHONE (OUTPATIENT)
Facility: CLINIC | Age: 49
End: 2024-05-06

## 2024-05-06 NOTE — TELEPHONE ENCOUNTER
PA completed.  Ozempic (0.25 or 0.5 MG/DOSE) 2MG/3ML pen-injectors   Pending...  Office notes and labs also sent

## 2024-05-07 ENCOUNTER — TELEPHONE (OUTPATIENT)
Facility: CLINIC | Age: 49
End: 2024-05-07

## 2024-05-07 DIAGNOSIS — E11.9 TYPE 2 DIABETES MELLITUS WITHOUT COMPLICATION, WITHOUT LONG-TERM CURRENT USE OF INSULIN (HCC): Primary | ICD-10-CM

## 2024-05-07 DIAGNOSIS — G47.26 SHIFT WORK SLEEP DISORDER: ICD-10-CM

## 2024-05-07 RX ORDER — MODAFINIL 200 MG/1
200 TABLET ORAL DAILY
Qty: 30 TABLET | Refills: 2 | Status: SHIPPED | OUTPATIENT
Start: 2024-05-07 | End: 2024-08-05

## 2024-05-07 NOTE — TELEPHONE ENCOUNTER
Patient states that the insurance won't cover the Ozempic and is requesting to be put back on the metformin.     Patient also requesting Modafinil please let pharmacy know that this medication is self pay don't run thru insurance.

## 2024-05-28 ENCOUNTER — TELEPHONE (OUTPATIENT)
Facility: CLINIC | Age: 49
End: 2024-05-28

## 2024-05-28 DIAGNOSIS — G47.26 SHIFT WORK SLEEP DISORDER: ICD-10-CM

## 2024-05-28 NOTE — TELEPHONE ENCOUNTER
Pt is needing his modafinil (PROVIGIL) 200 MG tablet transferred to [facility] [address] [place] [address] JAIRO WRAY  SHIREEN [phone] -  [phone] [[id number]] instead of [facility] because they do not take his insurance anymore.     PCP: Jillian Moy MD    Last appt: [unfilled]  Future Appointments   Date Time Provider Department Center   6/11/2024  1:00 PM Jillian Moy MD [facility] AM[facility]   7/24/2024  8:40 AM Jillian Moy MD [facility] AM[facility]       Requested Prescriptions     Pending Prescriptions Disp Refills    modafinil (PROVIGIL) 200 MG tablet 30 tablet 2     Sig: Take 1 tablet by mouth daily for 90 days. Take 1 hour before work Max Daily Amount: 200 mg       Prior labs and Blood pressures:  BP Readings from Last 3 Encounters:   04/24/24 132/85   01/24/24 126/81   10/24/23 125/85     Lab Results   Component Value Date/Time     04/24/2024 10:39 AM    K 4.8 04/24/2024 10:39 AM     04/24/2024 10:39 AM    CO2 29 04/24/2024 10:39 AM    BUN 20 04/24/2024 10:39 AM    GFRAA >60 09/13/2022 08:58 AM     Lab Results   Component Value Date/Time    OLG5EUYZ 5.6 01/24/2024 09:29 AM     Lab Results   Component Value Date/Time    CHOL 274 04/24/2024 10:39 AM    HDL 61 04/24/2024 10:39 AM    .8 04/24/2024 10:39 AM    VLDL 36.2 04/24/2024 10:39 AM     No results found for: \"VITD3\"    Lab Results   Component Value Date/Time    TSH 2.580 09/13/2022 08:58 AM

## 2024-05-28 NOTE — TELEPHONE ENCOUNTER
Pt is needing his modafinil (PROVIGIL) 200 MG tablet transferred to [facility] [address] [place] [address] JAIRO WRAY  SHIREEN [phone] [[id number]] instead of [facility] because they do not take his insurance anymore.        Please advise

## 2024-05-29 RX ORDER — MODAFINIL 200 MG/1
200 TABLET ORAL DAILY
Qty: 30 TABLET | Refills: 2 | OUTPATIENT
Start: 2024-05-29 | End: 2024-08-27

## 2024-05-30 ENCOUNTER — TELEPHONE (OUTPATIENT)
Facility: CLINIC | Age: 49
End: 2024-05-30

## 2024-06-06 ENCOUNTER — TELEPHONE (OUTPATIENT)
Facility: CLINIC | Age: 49
End: 2024-06-06

## 2024-06-06 NOTE — TELEPHONE ENCOUNTER
Dr. Moy,    I spoked to Mr. Ring to let him know we had to resubmit his Modafinil 200mg to CoverMyMeds for prior authorization because it had .    He is requesting that it be resent to [facility] instead of [facility] because of price increase.    dafne

## 2024-06-07 DIAGNOSIS — G47.26 SHIFT WORK SLEEP DISORDER: ICD-10-CM

## 2024-06-07 RX ORDER — MODAFINIL 200 MG/1
200 TABLET ORAL DAILY
Qty: 30 TABLET | Refills: 1 | Status: SHIPPED | OUTPATIENT
Start: 2024-06-07 | End: 2024-08-06

## 2024-06-07 NOTE — TELEPHONE ENCOUNTER
PCP: Jillian Moy MD    Last appt: [unfilled]  Future Appointments   Date Time Provider Department Center   6/11/2024  1:00 PM Jillian Moy MD [facility] AM[facility]   7/24/2024  8:40 AM Jillian Moy MD [facility] AM[facility]       Requested Prescriptions     Pending Prescriptions Disp Refills    modafinil (PROVIGIL) 200 MG tablet 30 tablet 2     Sig: Take 1 tablet by mouth daily for 90 days. Take 1 hour before work Max Daily Amount: 200 mg       Prior labs and Blood pressures:  BP Readings from Last 3 Encounters:   04/24/24 132/85   01/24/24 126/81   10/24/23 125/85     Lab Results   Component Value Date/Time     04/24/2024 10:39 AM    K 4.8 04/24/2024 10:39 AM     04/24/2024 10:39 AM    CO2 29 04/24/2024 10:39 AM    BUN 20 04/24/2024 10:39 AM    GFRAA >60 09/13/2022 08:58 AM     Lab Results   Component Value Date/Time    LFP0DENH 5.6 01/24/2024 09:29 AM     Lab Results   Component Value Date/Time    CHOL 274 04/24/2024 10:39 AM    HDL 61 04/24/2024 10:39 AM    .8 04/24/2024 10:39 AM    VLDL 36.2 04/24/2024 10:39 AM     No results found for: \"VITD3\"    Lab Results   Component Value Date/Time    TSH 2.580 09/13/2022 08:58 AM

## 2024-06-11 ENCOUNTER — TELEMEDICINE (OUTPATIENT)
Facility: CLINIC | Age: 49
End: 2024-06-11
Payer: COMMERCIAL

## 2024-06-11 DIAGNOSIS — G47.26 SHIFT WORK SLEEP DISORDER: ICD-10-CM

## 2024-06-11 DIAGNOSIS — E78.2 MIXED HYPERLIPIDEMIA: Primary | ICD-10-CM

## 2024-06-11 PROCEDURE — 99214 OFFICE O/P EST MOD 30 MIN: CPT | Performed by: STUDENT IN AN ORGANIZED HEALTH CARE EDUCATION/TRAINING PROGRAM

## 2024-06-11 RX ORDER — ROSUVASTATIN CALCIUM 5 MG/1
5 TABLET, COATED ORAL DAILY
Qty: 90 TABLET | Refills: 0 | Status: SHIPPED | OUTPATIENT
Start: 2024-06-11

## 2024-06-11 SDOH — ECONOMIC STABILITY: FOOD INSECURITY: WITHIN THE PAST 12 MONTHS, YOU WORRIED THAT YOUR FOOD WOULD RUN OUT BEFORE YOU GOT MONEY TO BUY MORE.: NEVER TRUE

## 2024-06-11 SDOH — ECONOMIC STABILITY: FOOD INSECURITY: WITHIN THE PAST 12 MONTHS, THE FOOD YOU BOUGHT JUST DIDN'T LAST AND YOU DIDN'T HAVE MONEY TO GET MORE.: NEVER TRUE

## 2024-06-11 SDOH — ECONOMIC STABILITY: INCOME INSECURITY: HOW HARD IS IT FOR YOU TO PAY FOR THE VERY BASICS LIKE FOOD, HOUSING, MEDICAL CARE, AND HEATING?: NOT HARD AT ALL

## 2024-06-11 ASSESSMENT — ENCOUNTER SYMPTOMS
NAUSEA: 0
VOMITING: 0
SHORTNESS OF BREATH: 0
RHINORRHEA: 0
ABDOMINAL PAIN: 0
COUGH: 0

## 2024-06-11 NOTE — PROGRESS NOTES
Simba Ring, was evaluated through a synchronous (real-time) audio-video encounter. The patient (or guardian if applicable) is aware that this is a billable service, which includes applicable co-pays. This Virtual Visit was conducted with patient's (and/or legal guardian's) consent. Patient identification was verified, and a caregiver was present when appropriate.   The patient was located at Home: [address] [place] [id number]  Provider was located at Facility (Appt Dept): [address] [facility]  [address]  [place] [address]    Simba Ring (:  1975) is a Established patient, presenting virtually for evaluation of the following:    Assessment & Plan   Below is the assessment and plan developed based on review of pertinent history, physical exam, labs, studies, and medications.    1. Mixed hyperlipidemia  -     rosuvastatin (CRESTOR) 5 MG tablet; Take 1 tablet by mouth daily, Disp-90 tablet, R-0Normal  -Chronic.  Uncontrolled.  -Patient is agreeable to starting low-dose cholesterol medication  -Therefore start Crestor 5 mg daily  -Repeat lipid panel in 3 months  -Reviewed medication risks and side effects    2. Shift work sleep disorder  -History of shift work sleep disorder  -Has noted improvement with modafinil as needed prior to work  -Denies any side effects with the medications  - reviewed  -Continue current dosage of modafinil at this time      Return in about 3 months (around 2024), or if symptoms worsen or fail to improve.       Subjective   Patient is a 49-year-old male who presents via virtual visit today to discuss hypercholesterolemia and shift work sleep disorder.  Patient has a known history of shift work sleep disorder and has been on modafinil daily.  He notes great improvement on the medication.  He has previously tried Adderall, Adderall XR and other medications without benefit or that caused side effects.  He denies any side effects on the modafinil.  He

## 2024-06-11 NOTE — PROGRESS NOTES
dentified pt with two pt identifiers(name and ).    Chief Complaint   Patient presents with    Follow-up     To talk about statin.        Health Maintenance Due   Topic    Hepatitis B vaccine (1 of 3 - 3-dose series)    COVID-19 Vaccine (1)    Pneumococcal 0-64 years Vaccine (1 of 2 - PCV)    HIV screen     Diabetic retinal exam     Colorectal Cancer Screen        Wt Readings from Last 3 Encounters:   24 125.2 kg (276 lb)   24 122.5 kg (270 lb)   10/24/23 120.7 kg (266 lb)     Temp Readings from Last 3 Encounters:   24 97.2 °F (36.2 °C) (Temporal)   24 97.6 °F (36.4 °C) (Temporal)   10/24/23 97.3 °F (36.3 °C) (Temporal)     BP Readings from Last 3 Encounters:   24 132/85   24 126/81   10/24/23 125/85     Pulse Readings from Last 3 Encounters:   24 76   24 65   10/24/23 83           Coordination of Care Questionnaire:  :   1. \"Have you been to the ER, urgent care clinic since your last visit?  Hospitalized since your last visit?\" no    2. \"Have you seen or consulted any other health care providers outside of the [facility] since your last visit?\" no     3. For patients aged 45-75: Has the patient had a colonoscopy / FIT/ Cologuard? no      If the patient is female:    4. For patients aged 40-74: Has the patient had a mammogram within the past 2 years? no      5. For patients aged 21-65: Has the patient had a pap smear? no     3) Do you have an Advance Directive on file? no  Are you interested in receiving information about Advance Directives? no    Patient is accompanied by self I have received verbal consent from Simba Ring to discuss any/all medical information while they are present in the room.

## 2024-06-13 ENCOUNTER — TELEPHONE (OUTPATIENT)
Facility: CLINIC | Age: 49
End: 2024-06-13

## 2024-06-14 NOTE — TELEPHONE ENCOUNTER
PA denied not a preferred medication,Must try and fail 2 preferred meds prior to approval see denial letter scanned

## 2024-08-26 DIAGNOSIS — G47.26 SHIFT WORK SLEEP DISORDER: ICD-10-CM

## 2024-08-28 RX ORDER — MODAFINIL 200 MG/1
TABLET ORAL
Qty: 30 TABLET | Refills: 0 | Status: SHIPPED | OUTPATIENT
Start: 2024-08-28 | End: 2024-09-27

## 2024-08-29 ENCOUNTER — TELEPHONE (OUTPATIENT)
Facility: CLINIC | Age: 49
End: 2024-08-29

## 2024-09-03 ENCOUNTER — OFFICE VISIT (OUTPATIENT)
Facility: CLINIC | Age: 49
End: 2024-09-03
Payer: COMMERCIAL

## 2024-09-03 VITALS
TEMPERATURE: 97.2 F | SYSTOLIC BLOOD PRESSURE: 129 MMHG | HEIGHT: 78 IN | BODY MASS INDEX: 31.7 KG/M2 | RESPIRATION RATE: 18 BRPM | DIASTOLIC BLOOD PRESSURE: 89 MMHG | HEART RATE: 86 BPM | WEIGHT: 274 LBS | OXYGEN SATURATION: 95 %

## 2024-09-03 DIAGNOSIS — E11.9 TYPE 2 DIABETES MELLITUS WITHOUT COMPLICATION, WITHOUT LONG-TERM CURRENT USE OF INSULIN (HCC): Primary | ICD-10-CM

## 2024-09-03 DIAGNOSIS — E78.2 MIXED HYPERLIPIDEMIA: ICD-10-CM

## 2024-09-03 DIAGNOSIS — R68.82 DECREASED LIBIDO: ICD-10-CM

## 2024-09-03 DIAGNOSIS — E55.9 VITAMIN D DEFICIENCY: ICD-10-CM

## 2024-09-03 DIAGNOSIS — G47.26 SHIFT WORK SLEEP DISORDER: ICD-10-CM

## 2024-09-03 LAB
25(OH)D3 SERPL-MCNC: 17.7 NG/ML (ref 30–100)
ALBUMIN SERPL-MCNC: 4.4 G/DL (ref 3.5–5)
ALBUMIN/GLOB SERPL: 1.5 (ref 1.1–2.2)
ALP SERPL-CCNC: 55 U/L (ref 45–117)
ALT SERPL-CCNC: 43 U/L (ref 12–78)
ANION GAP SERPL CALC-SCNC: 3 MMOL/L (ref 5–15)
AST SERPL-CCNC: 29 U/L (ref 15–37)
BILIRUB SERPL-MCNC: 0.3 MG/DL (ref 0.2–1)
BUN SERPL-MCNC: 17 MG/DL (ref 6–20)
BUN/CREAT SERPL: 17 (ref 12–20)
CALCIUM SERPL-MCNC: 9.5 MG/DL (ref 8.5–10.1)
CHLORIDE SERPL-SCNC: 108 MMOL/L (ref 97–108)
CHOLEST SERPL-MCNC: 156 MG/DL
CO2 SERPL-SCNC: 29 MMOL/L (ref 21–32)
CREAT SERPL-MCNC: 1.02 MG/DL (ref 0.7–1.3)
EST. AVERAGE GLUCOSE BLD GHB EST-MCNC: 123 MG/DL
GLOBULIN SER CALC-MCNC: 2.9 G/DL (ref 2–4)
GLUCOSE SERPL-MCNC: 147 MG/DL (ref 65–100)
HBA1C MFR BLD: 5.9 % (ref 4–5.6)
HDLC SERPL-MCNC: 46 MG/DL
HDLC SERPL: 3.4 (ref 0–5)
LDLC SERPL CALC-MCNC: 89.6 MG/DL (ref 0–100)
POTASSIUM SERPL-SCNC: 4.7 MMOL/L (ref 3.5–5.1)
PROT SERPL-MCNC: 7.3 G/DL (ref 6.4–8.2)
SODIUM SERPL-SCNC: 140 MMOL/L (ref 136–145)
TRIGL SERPL-MCNC: 102 MG/DL
VLDLC SERPL CALC-MCNC: 20.4 MG/DL

## 2024-09-03 PROCEDURE — 3044F HG A1C LEVEL LT 7.0%: CPT | Performed by: STUDENT IN AN ORGANIZED HEALTH CARE EDUCATION/TRAINING PROGRAM

## 2024-09-03 PROCEDURE — 99214 OFFICE O/P EST MOD 30 MIN: CPT | Performed by: STUDENT IN AN ORGANIZED HEALTH CARE EDUCATION/TRAINING PROGRAM

## 2024-09-03 RX ORDER — ROSUVASTATIN CALCIUM 5 MG/1
5 TABLET, COATED ORAL DAILY
Qty: 90 TABLET | Refills: 1 | Status: SHIPPED | OUTPATIENT
Start: 2024-09-03

## 2024-09-03 ASSESSMENT — ENCOUNTER SYMPTOMS
ABDOMINAL PAIN: 0
COUGH: 0
NAUSEA: 0
VOMITING: 0
SHORTNESS OF BREATH: 0
RHINORRHEA: 0

## 2024-09-03 NOTE — PROGRESS NOTES
Assessment/Plan:     Diagnoses and all orders for this visit:    Type 2 diabetes mellitus without complication, without long-term current use of insulin (HCC)  -     Hemoglobin A1C; Future  -     Comprehensive Metabolic Panel; Future  -Chronic.  Presumed stable.  -Continue metformin 500 mg twice daily  -Check routine lab work including hemoglobin A1c  -Continue diabetic diet  -Continue routine diabetic eye exams    Shift work sleep disorder  -History of shift work sleep disorder  -Has noted improvement with modafinil as needed prior to work  -Denies any side effects with the medications  - reviewed  -Continue current dosage of modafinil at this time    Mixed hyperlipidemia  -     rosuvastatin (CRESTOR) 5 MG tablet; Take 1 tablet by mouth daily  -     Lipid Panel; Future  -     Comprehensive Metabolic Panel; Future  -Chronic.  Due for repeat lipid panel check  -Has been on Crestor 5 mg daily for 3 months  -Continue current dosage    Vitamin D deficiency  -     Vitamin D 25 Hydroxy; Future  -Previous history of vitamin D deficiency  -Repeat vitamin D level  -Pending result will likely recommend vitamin D3 1000 units daily    Decreased libido  -     Testosterone, free, total; Future  -Patient endorses history of decreased libido without erectile dysfunction  -He would like to have his testosterone level checked.  Labs ordered today         Return in about 3 months (around 12/3/2024), or if symptoms worsen or fail to improve.     Discussed expected course/resolution/complications of diagnosis in detail with patient.    Medication risks/benefits/costs/interactions/alternatives discussed with patient.    Pt expressed understanding with the diagnosis and plan      Subjective:      Simba Ring is a 49 y.o. male who presents for had concerns including Follow-up (3 month).     Current Outpatient Medications   Medication Sig Dispense Refill    rosuvastatin (CRESTOR) 5 MG tablet Take 1 tablet by mouth daily 90 tablet 1

## 2024-09-03 NOTE — PROGRESS NOTES
Room:  I have reviewed all needed documentation in preparation for visit. Verified patient by name and date of birth  Chief Complaint   Patient presents with    Follow-up     3 month       Vitals:    09/03/24 0836   BP: 129/89   Pulse: 86   Resp: 18   Temp: (!) 96.7 °F (35.9 °C)   TempSrc: Temporal   SpO2: 95%   Weight: 124.3 kg (274 lb)   Height: 1.981 m (6' 6\")        Health Maintenance Due   Topic Date Due    Pneumococcal 0-64 years Vaccine (1 of 2 - PCV) Never done    HIV screen  Never done    Diabetic retinal exam  Never done    Hepatitis B vaccine (1 of 3 - 19+ 3-dose series) Never done    Colorectal Cancer Screen  Never done    Flu vaccine (1) Never done    COVID-19 Vaccine (1 - 2023-24 season) Never done        1. \"Have you been to the ER, urgent care clinic since your last visit?  Hospitalized since your last visit?\" No     2. \"Have you seen or consulted any other health care providers outside of the [facility] since your last visit?\" No      3. For patients aged 45-75: Has the patient had a colonoscopy / FIT/ Cologuard? No

## 2024-09-04 LAB
TESTOST FREE SERPL-MCNC: 4.7 PG/ML (ref 6.8–21.5)
TESTOST SERPL-MCNC: 323 NG/DL (ref 264–916)

## 2024-09-12 ENCOUNTER — TELEPHONE (OUTPATIENT)
Facility: CLINIC | Age: 49
End: 2024-09-12

## 2024-09-13 DIAGNOSIS — E55.9 VITAMIN D DEFICIENCY: Primary | ICD-10-CM

## 2024-09-13 RX ORDER — CHOLECALCIFEROL (VITAMIN D3) 1250 MCG
1 CAPSULE ORAL WEEKLY
Qty: 12 CAPSULE | Refills: 0 | Status: SHIPPED | OUTPATIENT
Start: 2024-09-13

## 2024-10-04 DIAGNOSIS — G47.26 SHIFT WORK SLEEP DISORDER: ICD-10-CM

## 2024-10-04 NOTE — TELEPHONE ENCOUNTER
Pt was seen last on 9/3/23 and supposed to see PCP every 3 months. He is expecting a 90 day refill or 3 months automatic refill. He has a schedule for 1/6/2024. Please advise about Modafinil 200mg refill and send Rx to [facility].

## 2024-10-07 DIAGNOSIS — G47.26 SHIFT WORK SLEEP DISORDER: Primary | ICD-10-CM

## 2024-10-07 RX ORDER — MODAFINIL 200 MG/1
200 TABLET ORAL DAILY
COMMUNITY
End: 2024-10-07 | Stop reason: SDUPTHER

## 2024-10-07 NOTE — TELEPHONE ENCOUNTER
Please correct the last visit was 9/3/2024 not 2023. Pt asking for refill and this med was discussed during that visit. Does he still have to be seen tomorrow? Please call patient to let him know if he will get the refill or he still needs to be seen.

## 2024-10-08 RX ORDER — MODAFINIL 200 MG/1
TABLET ORAL
Qty: 30 TABLET | Refills: 0 | OUTPATIENT
Start: 2024-10-08 | End: 2024-11-07

## 2024-10-08 RX ORDER — MODAFINIL 200 MG/1
200 TABLET ORAL DAILY
Qty: 30 TABLET | Refills: 1 | Status: SHIPPED | OUTPATIENT
Start: 2024-10-08 | End: 2024-12-07

## 2024-12-16 DIAGNOSIS — G47.26 SHIFT WORK SLEEP DISORDER: ICD-10-CM

## 2024-12-17 RX ORDER — MODAFINIL 200 MG/1
200 TABLET ORAL DAILY
Qty: 30 TABLET | Refills: 0 | Status: SHIPPED | OUTPATIENT
Start: 2024-12-17 | End: 2025-01-16

## 2025-01-09 ENCOUNTER — OFFICE VISIT (OUTPATIENT)
Facility: CLINIC | Age: 50
End: 2025-01-09
Payer: COMMERCIAL

## 2025-01-09 VITALS
WEIGHT: 257.4 LBS | DIASTOLIC BLOOD PRESSURE: 74 MMHG | RESPIRATION RATE: 20 BRPM | TEMPERATURE: 97 F | SYSTOLIC BLOOD PRESSURE: 111 MMHG | BODY MASS INDEX: 29.78 KG/M2 | HEART RATE: 94 BPM | HEIGHT: 78 IN | OXYGEN SATURATION: 98 %

## 2025-01-09 DIAGNOSIS — E55.9 VITAMIN D DEFICIENCY: ICD-10-CM

## 2025-01-09 DIAGNOSIS — G47.26 SHIFT WORK SLEEP DISORDER: ICD-10-CM

## 2025-01-09 DIAGNOSIS — E11.9 TYPE 2 DIABETES MELLITUS WITHOUT COMPLICATION, WITHOUT LONG-TERM CURRENT USE OF INSULIN (HCC): Primary | ICD-10-CM

## 2025-01-09 DIAGNOSIS — E78.2 MIXED HYPERLIPIDEMIA: ICD-10-CM

## 2025-01-09 LAB
25(OH)D3 SERPL-MCNC: 63.6 NG/ML (ref 30–100)
ANION GAP SERPL CALC-SCNC: 6 MMOL/L (ref 2–12)
BUN SERPL-MCNC: 18 MG/DL (ref 6–20)
BUN/CREAT SERPL: 17 (ref 12–20)
CALCIUM SERPL-MCNC: 9.4 MG/DL (ref 8.5–10.1)
CHLORIDE SERPL-SCNC: 107 MMOL/L (ref 97–108)
CO2 SERPL-SCNC: 24 MMOL/L (ref 21–32)
CREAT SERPL-MCNC: 1.08 MG/DL (ref 0.7–1.3)
EST. AVERAGE GLUCOSE BLD GHB EST-MCNC: 111 MG/DL
GLUCOSE SERPL-MCNC: 137 MG/DL (ref 65–100)
HBA1C MFR BLD: 5.5 % (ref 4–5.6)
POTASSIUM SERPL-SCNC: 4 MMOL/L (ref 3.5–5.1)
SODIUM SERPL-SCNC: 137 MMOL/L (ref 136–145)

## 2025-01-09 PROCEDURE — 99214 OFFICE O/P EST MOD 30 MIN: CPT | Performed by: STUDENT IN AN ORGANIZED HEALTH CARE EDUCATION/TRAINING PROGRAM

## 2025-01-09 PROCEDURE — 3044F HG A1C LEVEL LT 7.0%: CPT | Performed by: STUDENT IN AN ORGANIZED HEALTH CARE EDUCATION/TRAINING PROGRAM

## 2025-01-09 SDOH — ECONOMIC STABILITY: FOOD INSECURITY: WITHIN THE PAST 12 MONTHS, YOU WORRIED THAT YOUR FOOD WOULD RUN OUT BEFORE YOU GOT MONEY TO BUY MORE.: NEVER TRUE

## 2025-01-09 SDOH — ECONOMIC STABILITY: FOOD INSECURITY: WITHIN THE PAST 12 MONTHS, THE FOOD YOU BOUGHT JUST DIDN'T LAST AND YOU DIDN'T HAVE MONEY TO GET MORE.: NEVER TRUE

## 2025-01-09 ASSESSMENT — PATIENT HEALTH QUESTIONNAIRE - PHQ9
SUM OF ALL RESPONSES TO PHQ QUESTIONS 1-9: 0
4. FEELING TIRED OR HAVING LITTLE ENERGY: NOT AT ALL
5. POOR APPETITE OR OVEREATING: NOT AT ALL
3. TROUBLE FALLING OR STAYING ASLEEP: NOT AT ALL
SUM OF ALL RESPONSES TO PHQ9 QUESTIONS 1 & 2: 0
10. IF YOU CHECKED OFF ANY PROBLEMS, HOW DIFFICULT HAVE THESE PROBLEMS MADE IT FOR YOU TO DO YOUR WORK, TAKE CARE OF THINGS AT HOME, OR GET ALONG WITH OTHER PEOPLE: NOT DIFFICULT AT ALL
7. TROUBLE CONCENTRATING ON THINGS, SUCH AS READING THE NEWSPAPER OR WATCHING TELEVISION: NOT AT ALL
SUM OF ALL RESPONSES TO PHQ QUESTIONS 1-9: 0
9. THOUGHTS THAT YOU WOULD BE BETTER OFF DEAD, OR OF HURTING YOURSELF: NOT AT ALL
SUM OF ALL RESPONSES TO PHQ QUESTIONS 1-9: 0
1. LITTLE INTEREST OR PLEASURE IN DOING THINGS: NOT AT ALL
6. FEELING BAD ABOUT YOURSELF - OR THAT YOU ARE A FAILURE OR HAVE LET YOURSELF OR YOUR FAMILY DOWN: NOT AT ALL
SUM OF ALL RESPONSES TO PHQ QUESTIONS 1-9: 0
8. MOVING OR SPEAKING SO SLOWLY THAT OTHER PEOPLE COULD HAVE NOTICED. OR THE OPPOSITE, BEING SO FIGETY OR RESTLESS THAT YOU HAVE BEEN MOVING AROUND A LOT MORE THAN USUAL: NOT AT ALL
2. FEELING DOWN, DEPRESSED OR HOPELESS: NOT AT ALL

## 2025-01-09 ASSESSMENT — ENCOUNTER SYMPTOMS
ABDOMINAL PAIN: 0
VOMITING: 0
SHORTNESS OF BREATH: 0
NAUSEA: 0
RHINORRHEA: 0
COUGH: 0

## 2025-01-09 NOTE — PROGRESS NOTES
dentified pt with two pt identifiers(name and ).    Chief Complaint   Patient presents with    3 Month Follow-Up     Patient here for follow up for diabetes, sleep disorder & cholesterol        Health Maintenance Due   Topic    Pneumococcal 0-64 years Vaccine (1 of 2 - PCV)    HIV screen     Diabetic retinal exam     Hepatitis B vaccine (1 of 3 - 19+ 3-dose series)    Colorectal Cancer Screen     Flu vaccine (1)    COVID-19 Vaccine ( season)    Diabetic foot exam        Wt Readings from Last 3 Encounters:   25 116.8 kg (257 lb 6.4 oz)   24 124.3 kg (274 lb)   24 125.2 kg (276 lb)     Temp Readings from Last 3 Encounters:   25 97 °F (36.1 °C) (Temporal)   24 97.2 °F (36.2 °C) (Temporal)   24 97.2 °F (36.2 °C) (Temporal)     BP Readings from Last 3 Encounters:   25 111/74   24 129/89   24 132/85     Pulse Readings from Last 3 Encounters:   25 94   24 86   24 76           Coordination of Care Questionnaire:  :   1. \"Have you been to the ER, urgent care clinic since your last visit?  Hospitalized since your last visit?\" no    2. \"Have you seen or consulted any other health care providers outside of the [facility] since your last visit?\" no     3. For patients aged 45-75: Has the patient had a colonoscopy / FIT/ Cologuard? no      If the patient is female:    4. For patients aged 40-74: Has the patient had a mammogram within the past 2 years? no      5. For patients aged 21-65: Has the patient had a pap smear? no     3) Do you have an Advance Directive on file? no  Are you interested in receiving information about Advance Directives? no    Patient is accompanied by self I have received verbal consent from Simba Ring to discuss any/all medical information while they are present in the room.

## 2025-01-09 NOTE — PROGRESS NOTES
Assessment/Plan:     Diagnoses and all orders for this visit:    Type 2 diabetes mellitus without complication, without long-term current use of insulin (HCC)  -     Basic Metabolic Panel; Future  -     Hemoglobin A1C; Future  -      DIABETES FOOT EXAM  -Chronic.  Presumed stable.  -Continue metformin 500 mg twice daily  -Check hemoglobin A1c  -Continue diabetic diet  -Continue routine diabetic eye exam    Shift work sleep disorder  -     modafinil (PROVIGIL) 200 MG tablet; Take 1 tablet by mouth daily for 30 days. Take 1 hour before work. Max Daily Amount: 200 mg  -History of shift work sleep disorder  -Has noted improvement with modafinil as needed prior to work  -Denies any side effects with the medications  -Has tried other medications including Adderall without benefit or he noted side effects  -Modafinil has become costly.  Will try to see if prior authorization can be completed.  - reviewed  -Continue current dosage of modafinil at this time    Mixed hyperlipidemia  -Chronic.  Stable.  -Patient would like to decrease his rosuvastatin from daily to 3 days/week  -Can try taking it 3 days/week and at follow-up repeat lipid panel    Vitamin D deficiency  -     Vitamin D 25 Hydroxy; Future  -Chronic.  Previous history of vitamin D deficiency  -Recheck vitamin D levels.       Please note that this dictation was completed with [employer] the [employer] voice recognition software. Quite often unanticipated grammatical, syntax, homophones, and other interpretive errors are inadvertently transcribed by the computer software. Please disregard these errors. Please excuse any errors that have escaped final proofreading.      Return in about 3 months (around 4/9/2025).     Discussed expected course/resolution/complications of diagnosis in detail with patient.    Medication risks/benefits/costs/interactions/alternatives discussed with patient.    Pt expressed understanding with the diagnosis and plan      Subjective:

## 2025-01-10 DIAGNOSIS — E11.9 TYPE 2 DIABETES MELLITUS WITHOUT COMPLICATION, WITHOUT LONG-TERM CURRENT USE OF INSULIN (HCC): ICD-10-CM

## 2025-01-10 RX ORDER — MODAFINIL 200 MG/1
200 TABLET ORAL DAILY
Qty: 30 TABLET | Refills: 0 | Status: SHIPPED | OUTPATIENT
Start: 2025-01-10 | End: 2025-02-09

## 2025-01-10 NOTE — TELEPHONE ENCOUNTER
PCP: Jillian Moy MD    Last appt: [unfilled]  Future Appointments   Date Time Provider Department Center   4/9/2025 10:00 AM Jillian Moy MD [facility]       Requested Prescriptions      No prescriptions requested or ordered in this encounter       Prior labs and Blood pressures:  BP Readings from Last 3 Encounters:   01/09/25 111/74   09/03/24 129/89   04/24/24 132/85     Lab Results   Component Value Date/Time     01/09/2025 10:46 AM    K 4.0 01/09/2025 10:46 AM     01/09/2025 10:46 AM    CO2 24 01/09/2025 10:46 AM    BUN 18 01/09/2025 10:46 AM    GFRAA >60 09/13/2022 08:58 AM     Lab Results   Component Value Date/Time    [id number] 5.6 01/24/2024 09:29 AM     Lab Results   Component Value Date/Time    CHOL 156 09/03/2024 09:11 AM    HDL 46 09/03/2024 09:11 AM    LDL 89.6 09/03/2024 09:11 AM    .8 04/24/2024 10:39 AM    VLDL 20.4 09/03/2024 09:11 AM     No results found for: \"VITD3\"    Lab Results   Component Value Date/Time    TSH 2.41 04/24/2024 10:39 AM

## 2025-01-10 NOTE — TELEPHONE ENCOUNTER
Pt forgot to ask for refill for metFORMIN (GLUCOPHAGE) 500 MG tablet  at his last visit. Please send RX to [facility] if approved

## 2025-01-16 ENCOUNTER — TELEPHONE (OUTPATIENT)
Facility: CLINIC | Age: 50
End: 2025-01-16

## 2025-01-17 NOTE — TELEPHONE ENCOUNTER
Resubmitted PA for Modafinil to CoverMyMeds with notes.  Mr. Ring was notified.  He will wait to pick this up from his pharmacy until it gets approved.

## 2025-01-27 ENCOUNTER — TELEPHONE (OUTPATIENT)
Facility: CLINIC | Age: 50
End: 2025-01-27

## 2025-01-27 NOTE — TELEPHONE ENCOUNTER
Approved Modafinil.  #[id number]  Eff 01/16/25 to 01/15/26    Mr. Ring was notified and has already picked his medication up from pharmacy.

## 2025-02-21 DIAGNOSIS — G47.26 SHIFT WORK SLEEP DISORDER: ICD-10-CM

## 2025-02-24 NOTE — TELEPHONE ENCOUNTER
Chief Complaint   Patient presents with    Medication Refill       Requested Prescriptions     Pending Prescriptions Disp Refills    modafinil (PROVIGIL) 200 MG tablet [Pharmacy Med Name: Modafinil 200 MG Oral Tablet] 30 tablet 0     Sig: TAKE 1 TABLET BY MOUTH ONCE DAILY ONE  HOUR  BEFORE  WORK  FOR  30  DAYS.  MAX  DAILY  AMOUNT  IS  200  MG       Allergies:  Allergies   Allergen Reactions    Fluoxetine Other (See Comments)     Induced eben    Diclofenac Swelling     Leg swelling    Levofloxacin Other (See Comments)     Pins and needles sensation to arms and legs    Naproxen Swelling     Leg swelling    Peanut Allergen Powder-Dnfp Hives     Not Allergic to per Mr Ring       Last visit with clinic:  1/9/2025   Next visit with clinic: 4/9/2025     Last visit with this provider: 1/9/2025   Next Visit with this provider: 4/9/2025    Signed by Krista Scott MA [facility]  02/24/25  2:50 PM

## 2025-02-25 ENCOUNTER — TELEPHONE (OUTPATIENT)
Facility: CLINIC | Age: 50
End: 2025-02-25

## 2025-02-25 RX ORDER — MODAFINIL 200 MG/1
TABLET ORAL
Qty: 30 TABLET | Refills: 0 | Status: SHIPPED | OUTPATIENT
Start: 2025-02-25 | End: 2025-03-27

## 2025-02-25 NOTE — TELEPHONE ENCOUNTER
Pt called in stating his pharmacy hasn't been able to get in touch with our office to authorize a refill. PT needs a refill on modafinil.    # [phone]

## 2025-03-28 DIAGNOSIS — G47.26 SHIFT WORK SLEEP DISORDER: ICD-10-CM

## 2025-03-28 RX ORDER — MODAFINIL 200 MG/1
TABLET ORAL
Qty: 30 TABLET | Refills: 0 | Status: SHIPPED | OUTPATIENT
Start: 2025-03-28 | End: 2025-04-27

## 2025-04-09 ENCOUNTER — OFFICE VISIT (OUTPATIENT)
Facility: CLINIC | Age: 50
End: 2025-04-09
Payer: COMMERCIAL

## 2025-04-09 VITALS
HEART RATE: 88 BPM | SYSTOLIC BLOOD PRESSURE: 115 MMHG | RESPIRATION RATE: 16 BRPM | HEIGHT: 78 IN | OXYGEN SATURATION: 100 % | WEIGHT: 256 LBS | TEMPERATURE: 97.5 F | DIASTOLIC BLOOD PRESSURE: 77 MMHG | BODY MASS INDEX: 29.62 KG/M2

## 2025-04-09 DIAGNOSIS — G47.26 SHIFT WORK SLEEP DISORDER: Primary | ICD-10-CM

## 2025-04-09 DIAGNOSIS — E78.2 MIXED HYPERLIPIDEMIA: ICD-10-CM

## 2025-04-09 DIAGNOSIS — L90.5 SCAR: ICD-10-CM

## 2025-04-09 PROCEDURE — 99213 OFFICE O/P EST LOW 20 MIN: CPT | Performed by: STUDENT IN AN ORGANIZED HEALTH CARE EDUCATION/TRAINING PROGRAM

## 2025-04-09 RX ORDER — CEPHRADINE 500 MG
CAPSULE ORAL
COMMUNITY

## 2025-04-09 RX ORDER — MODAFINIL 200 MG/1
200 TABLET ORAL DAILY
Qty: 30 TABLET | Refills: 2 | Status: SHIPPED | OUTPATIENT
Start: 2025-04-09 | End: 2025-04-09 | Stop reason: SDUPTHER

## 2025-04-09 RX ORDER — MODAFINIL 200 MG/1
200 TABLET ORAL DAILY
Qty: 30 TABLET | Refills: 2 | Status: SHIPPED | OUTPATIENT
Start: 2025-04-09 | End: 2025-07-08

## 2025-04-09 RX ORDER — ROSUVASTATIN CALCIUM 5 MG/1
5 TABLET, COATED ORAL
Qty: 50 TABLET | Refills: 0 | Status: SHIPPED | OUTPATIENT
Start: 2025-04-09

## 2025-04-09 ASSESSMENT — ENCOUNTER SYMPTOMS
SHORTNESS OF BREATH: 0
ABDOMINAL PAIN: 0
NAUSEA: 0
VOMITING: 0
COUGH: 0
RHINORRHEA: 0

## 2025-04-09 ASSESSMENT — PATIENT HEALTH QUESTIONNAIRE - PHQ9
SUM OF ALL RESPONSES TO PHQ QUESTIONS 1-9: 0
1. LITTLE INTEREST OR PLEASURE IN DOING THINGS: NOT AT ALL
2. FEELING DOWN, DEPRESSED OR HOPELESS: NOT AT ALL
SUM OF ALL RESPONSES TO PHQ QUESTIONS 1-9: 0

## 2025-04-09 NOTE — PROGRESS NOTES
Simba Ring is a 50 y.o. male    Chief Complaint   Patient presents with    Diabetes     3 month follow up       /77   Pulse 88   Temp 97.5 °F (36.4 °C)   Resp 16   Ht 1.981 m (6' 6\")   Wt 116.1 kg (256 lb)   SpO2 100%   BMI 29.58 kg/m²         1. Have you been to the ER, urgent care clinic since your last visit?  Hospitalized since your last visit? No    2. Have you seen or consulted any other health care providers outside of the [facility] since your last visit?  Include any pap smears or colon screening. No    Learning Assessment:       No data to display                Fall Risk Assessment:      5/20/2019    11:50 PM 5/20/2019     3:35 PM 5/20/2019     2:09 PM   Amb Fall Risk Assessment and TUG Test   Total Score 1 1 0       Abuse Screening:       No data to display                ADL Screening:       No data to display                
     Conjunctiva/sclera: Conjunctivae normal.   Cardiovascular:      Rate and Rhythm: Normal rate and regular rhythm.      Heart sounds: Normal heart sounds. No murmur heard.     No friction rub. No gallop.   Pulmonary:      Effort: Pulmonary effort is normal. No respiratory distress.      Breath sounds: Normal breath sounds. No wheezing, rhonchi or rales.   Abdominal:      General: Bowel sounds are normal. There is no distension.      Palpations: Abdomen is soft.      Tenderness: There is no abdominal tenderness. There is no guarding or rebound.   Genitourinary:         Comments: Hypopigmented scar noted in right upper groin, no apparent hernia, mass or tenderness to palpation noted (nurse chaperone present for exam)  Musculoskeletal:      Cervical back: Neck supple.      Right lower leg: No edema.      Left lower leg: No edema.   Neurological:      Mental Status: He is alert and oriented to person, place, and time.      Gait: Gait normal.         No results found for this visit on 04/09/25.

## 2025-04-10 DIAGNOSIS — E78.2 MIXED HYPERLIPIDEMIA: ICD-10-CM

## 2025-04-11 RX ORDER — ROSUVASTATIN CALCIUM 5 MG/1
5 TABLET, COATED ORAL
Qty: 50 TABLET | Refills: 0 | OUTPATIENT
Start: 2025-04-11

## 2025-04-30 DIAGNOSIS — E11.9 TYPE 2 DIABETES MELLITUS WITHOUT COMPLICATION, WITHOUT LONG-TERM CURRENT USE OF INSULIN (HCC): ICD-10-CM

## 2025-07-10 DIAGNOSIS — E78.2 MIXED HYPERLIPIDEMIA: ICD-10-CM

## 2025-07-10 RX ORDER — ROSUVASTATIN CALCIUM 5 MG/1
5 TABLET, COATED ORAL
Qty: 50 TABLET | Refills: 0 | Status: SHIPPED | OUTPATIENT
Start: 2025-07-11

## 2025-07-14 ENCOUNTER — OFFICE VISIT (OUTPATIENT)
Facility: CLINIC | Age: 50
End: 2025-07-14
Payer: COMMERCIAL

## 2025-07-14 VITALS
SYSTOLIC BLOOD PRESSURE: 120 MMHG | HEIGHT: 78 IN | WEIGHT: 262 LBS | TEMPERATURE: 97.5 F | RESPIRATION RATE: 17 BRPM | DIASTOLIC BLOOD PRESSURE: 79 MMHG | HEART RATE: 67 BPM | BODY MASS INDEX: 30.31 KG/M2 | OXYGEN SATURATION: 97 %

## 2025-07-14 DIAGNOSIS — E11.9 TYPE 2 DIABETES MELLITUS WITHOUT COMPLICATION, WITHOUT LONG-TERM CURRENT USE OF INSULIN (HCC): Primary | ICD-10-CM

## 2025-07-14 DIAGNOSIS — E78.2 MIXED HYPERLIPIDEMIA: ICD-10-CM

## 2025-07-14 DIAGNOSIS — G47.26 SHIFT WORK SLEEP DISORDER: ICD-10-CM

## 2025-07-14 PROCEDURE — 3044F HG A1C LEVEL LT 7.0%: CPT | Performed by: STUDENT IN AN ORGANIZED HEALTH CARE EDUCATION/TRAINING PROGRAM

## 2025-07-14 PROCEDURE — 99214 OFFICE O/P EST MOD 30 MIN: CPT | Performed by: STUDENT IN AN ORGANIZED HEALTH CARE EDUCATION/TRAINING PROGRAM

## 2025-07-14 RX ORDER — MODAFINIL 200 MG/1
200 TABLET ORAL DAILY
Qty: 30 TABLET | Refills: 2 | Status: SHIPPED | OUTPATIENT
Start: 2025-07-14 | End: 2025-10-12

## 2025-07-14 ASSESSMENT — ENCOUNTER SYMPTOMS
SHORTNESS OF BREATH: 0
RHINORRHEA: 0
NAUSEA: 0
VOMITING: 0
COUGH: 0
ABDOMINAL PAIN: 0

## 2025-07-14 NOTE — PROGRESS NOTES
Assessment/Plan:     Diagnoses and all orders for this visit:  Type 2 diabetes mellitus without complication, without long-term current use of insulin (HCC)  -     CBC; Future  -     Comprehensive Metabolic Panel; Future  -     Hemoglobin A1C; Future  -     Albumin/Creatinine Ratio, Urine; Future  -     TSH; Future  -Chronic.  Presumed stable.  -Check routine lab work including hemoglobin A1c  -Continue metformin 500 mg twice daily  -Continue diabetic diet  -Continue routine diabetic eye exams    Mixed hyperlipidemia  -     Comprehensive Metabolic Panel; Future  -     Lipid Panel; Future  -     TSH; Future  -Chronic.  Due for repeat lipid panel check  -Currently on Crestor 3 days/week    Shift work sleep disorder  -     modafinil (PROVIGIL) 200 MG tablet; Take 1 tablet by mouth daily for 90 days. Max Daily Amount: 200 mg  -History of shift work sleep disorder  -Has noted improvement with modafinil as needed prior to work  -Denies any side effects with the medications  -Has tried other medications including Adderall without benefit or he noted side effects  - reviewed  -Continue current dosage of modafinil daily     Please note that this dictation was completed with [employer], the [employer] voice recognition software. Quite often unanticipated grammatical, syntax, homophones, and other interpretive errors are inadvertently transcribed by the computer software. Please disregard these errors. Please excuse any errors that have escaped final proofreading.      Return in about 3 months (around 10/14/2025).     Discussed expected course/resolution/complications of diagnosis in detail with patient.    Medication risks/benefits/costs/interactions/alternatives discussed with patient.    Pt expressed understanding with the diagnosis and plan.       Subjective:      Simba Ring is a 50 y.o. male who presents for had concerns including Follow-up (Patient here for sleep disorder & diabetes.).     Current Outpatient Medications

## 2025-07-14 NOTE — PROGRESS NOTES
dentified pt with two pt identifiers(name and ).    Chief Complaint   Patient presents with    Follow-up     Patient here for sleep disorder & diabetes.        Health Maintenance Due   Topic    HIV screen     Diabetic retinal exam     Hepatitis B vaccine (1 of 3 - 19+ 3-dose series)    Pneumococcal 0-49 years Vaccine (1 of 2 - PCV)    Colorectal Cancer Screen     COVID-19 Vaccine (2024- season)    Diabetic Alb to Cr ratio (uACR) test        Wt Readings from Last 3 Encounters:   25 116.1 kg (256 lb)   25 116.8 kg (257 lb 6.4 oz)   24 124.3 kg (274 lb)     Temp Readings from Last 3 Encounters:   25 97.5 °F (36.4 °C)   25 97 °F (36.1 °C) (Temporal)   24 97.2 °F (36.2 °C) (Temporal)     BP Readings from Last 3 Encounters:   25 115/77   25 111/74   24 129/89     Pulse Readings from Last 3 Encounters:   25 88   25 94   24 86           Coordination of Care Questionnaire:  :   1. \"Have you been to the ER, urgent care clinic since your last visit?  Hospitalized since your last visit?\" no    2. \"Have you seen or consulted any other health care providers outside of the [facility] since your last visit?\" no     3. For patients aged 45-75: Has the patient had a colonoscopy / FIT/ Cologuard? no      If the patient is female:    4. For patients aged 40-74: Has the patient had a mammogram within the past 2 years? no      5. For patients aged 21-65: Has the patient had a pap smear? no     3) Do you have an Advance Directive on file? no  Are you interested in receiving information about Advance Directives? no    Patient is accompanied by self I have received verbal consent from Simba Ring to discuss any/all medical information while they are present in the room.

## 2025-09-02 ENCOUNTER — COMMUNITY OUTREACH (OUTPATIENT)
Facility: CLINIC | Age: 50
End: 2025-09-02